# Patient Record
Sex: MALE | Race: OTHER | Employment: UNEMPLOYED | URBAN - METROPOLITAN AREA
[De-identification: names, ages, dates, MRNs, and addresses within clinical notes are randomized per-mention and may not be internally consistent; named-entity substitution may affect disease eponyms.]

---

## 2019-03-18 ENCOUNTER — HOSPITAL ENCOUNTER (EMERGENCY)
Age: 59
Discharge: HOME | End: 2019-03-18
Attending: EMERGENCY MEDICINE
Payer: COMMERCIAL

## 2019-03-18 VITALS
TEMPERATURE: 97.4 F | DIASTOLIC BLOOD PRESSURE: 92 MMHG | OXYGEN SATURATION: 96 % | HEART RATE: 67 BPM | RESPIRATION RATE: 16 BRPM | SYSTOLIC BLOOD PRESSURE: 154 MMHG

## 2019-03-18 DIAGNOSIS — M54.50 ACUTE MIDLINE LOW BACK PAIN WITHOUT SCIATICA: Primary | ICD-10-CM

## 2019-03-18 PROCEDURE — 96372 THER/PROPH/DIAG INJ SC/IM: CPT

## 2019-03-18 PROCEDURE — 74011250636 HC RX REV CODE- 250/636: Performed by: EMERGENCY MEDICINE

## 2019-03-18 PROCEDURE — 74011250637 HC RX REV CODE- 250/637: Performed by: EMERGENCY MEDICINE

## 2019-03-18 PROCEDURE — 99283 EMERGENCY DEPT VISIT LOW MDM: CPT

## 2019-03-18 RX ORDER — DIAZEPAM 5 MG/1
5 TABLET ORAL
Status: DISCONTINUED | OUTPATIENT
Start: 2019-03-18 | End: 2019-03-18

## 2019-03-18 RX ORDER — DIAZEPAM 5 MG/1
5 TABLET ORAL
Status: COMPLETED | OUTPATIENT
Start: 2019-03-18 | End: 2019-03-18

## 2019-03-18 RX ORDER — HYDROMORPHONE HYDROCHLORIDE 2 MG/ML
1 INJECTION, SOLUTION INTRAMUSCULAR; INTRAVENOUS; SUBCUTANEOUS
Status: COMPLETED | OUTPATIENT
Start: 2019-03-18 | End: 2019-03-18

## 2019-03-18 RX ORDER — METHOCARBAMOL 750 MG/1
750 TABLET, FILM COATED ORAL 4 TIMES DAILY
Qty: 28 TAB | Refills: 0 | Status: SHIPPED | OUTPATIENT
Start: 2019-03-18 | End: 2019-03-25

## 2019-03-18 RX ADMIN — HYDROMORPHONE HYDROCHLORIDE 1 MG: 2 INJECTION INTRAMUSCULAR; INTRAVENOUS; SUBCUTANEOUS at 14:58

## 2019-03-18 RX ADMIN — DIAZEPAM 5 MG: 5 TABLET ORAL at 13:34

## 2019-03-18 NOTE — ED TRIAGE NOTES
Pt chronic back pain patient with herniation of L4-L6. Pt states was working on fan of oven and the next day with 10/10 back pain with limited movement, sharp shooting pain. Denies radiation to legs or difficulty with bowel or bladder control.

## 2019-03-18 NOTE — ED PROVIDER NOTES
62 y.o. male with past medical history significant for Factor V Leiden, h/o PE, who presents to the ED with assistance of a wheelchair, with chief complaint of lumbar back pain. Pt reports that he has chronic back pain due to herniated lumbar discs. States that he is followed by Dr. Elizabeth Farfan at [facility] for neurosurgery and is scheduled to have a procedure to \"burn some nerves\" on 4/2/19. Notes that he is also followed by pain management. Pt reports that he was fixing his stove on Saturday (3/16/19) and felt something, but no pain. He states that he woke up Sunday morning with back pain and was unable to get out of bed. Notes that his pain is in the lumbar area and radiates upwards and down into the left leg. Pt describes his pain as \"shocking\" and rates his current pain as 10/10 in intensity. He states that his pain is exacerbated when sitting down or standing from a seated position. Reports that he took Tylenol, Advil and Meloxicam without relief, but took Norco 5/325 x 2 today with relief. Notes that he is currently taking Coumadin due to PE and Factor V Leiden. Pt specifically denies peritoneal numbness. There are no other acute medical concerns at this time. PCP: No primary care provider on file. Note written by Margot Sandoval, as dictated by Shahnaz Mcconnell MD 12:56 PM      The history is provided by the patient and medical records. No  was used. No past medical history on file. No past surgical history on file. No family history on file.     Social History     Socioeconomic History    Marital status: Not on file     Spouse name: Not on file    Number of children: Not on file    Years of education: Not on file    Highest education level: Not on file   Social Needs    Financial resource strain: Not on file    Food insecurity - worry: Not on file    Food insecurity - inability: Not on file    Transportation needs - medical: Not on file   [place] Transportation needs - non-medical: Not on file   Occupational History    Not on file   Tobacco Use    Smoking status: Not on file   Substance and Sexual Activity    Alcohol use: Not on file    Drug use: Not on file    Sexual activity: Not on file   Other Topics Concern    Not on file   Social History Narrative    Not on file         ALLERGIES: Morphine    Review of Systems   Constitutional: Negative for chills and fever. HENT: Negative for ear pain and sore throat. Eyes: Negative for pain. Respiratory: Negative for chest tightness and shortness of breath. Cardiovascular: Negative for chest pain and leg swelling. Gastrointestinal: Negative for abdominal pain, nausea and vomiting. Genitourinary: Negative for dysuria and flank pain. Musculoskeletal: Positive for back pain (lumbar). Skin: Negative for rash. Neurological: Negative for numbness and headaches. All other systems reviewed and are negative. Vitals:    03/18/19 1255   BP: (!) 154/92   Pulse: 67   Resp: 16   Temp: 97.4 °F (36.3 °C)   SpO2: 96%            Physical Exam   Constitutional: No distress. HENT:   Head: Normocephalic and atraumatic. Eyes: Conjunctivae are normal. Pupils are equal, round, and reactive to light. No scleral icterus. Neck: No tracheal deviation present. Cardiovascular: Normal rate, regular rhythm and normal heart sounds. Pulmonary/Chest: Effort normal and breath sounds normal. No stridor. No respiratory distress. Abdominal: Soft. He exhibits no distension. There is no tenderness. Musculoskeletal: He exhibits no edema or deformity. Lumbar back: He exhibits tenderness. Neurological: He is alert. Normal motor and sensation of lower extremities. Normal reflexes. Skin: Skin is warm and dry. Psychiatric: He has a normal mood and affect. Nursing note and vitals reviewed.   Note written by Margot Richey, as dictated by Sneha Mixon MD 12:56 PM      [facility] Procedures     3:13 PM  Patient's results have been reviewed with them. Patient and/or family have verbally conveyed their understanding and agreement of the patient's signs, symptoms, diagnosis, treatment and prognosis and additionally agree to follow up as recommended or return to the Emergency Room should their condition change prior to follow-up. Discharge instructions have also been provided to the patient with some educational information regarding their diagnosis as well a list of reasons why they would want to return to the ER prior to their follow-up appointment should their condition change. LABORATORY TESTS:  Labs Reviewed - No data to display    IMAGING RESULTS:  No results found. MEDICATIONS GIVEN:  Medications   diazePAM (VALIUM) tablet 5 mg (5 mg Oral Given 3/18/19 2744)   HYDROmorphone (PF) (DILAUDID) injection 1 mg (1 mg IntraMUSCular Given 3/18/19 1331)       IMPRESSION:  1. Acute midline low back pain without sciatica        PLAN:  1. Discharge Medication List as of 3/18/2019  3:13 PM      START taking these medications    Details   methocarbamol (ROBAXIN-750) 750 mg tablet Take 1 Tab by mouth four (4) times daily for 7 days. , Print, Disp-28 Tab, R-0           2. Follow-up Information     Follow up With Specialties Details Why Contact Info    Your spine doctor and pain doctor  Schedule an appointment as soon as possible for a visit      [facility] EMERGENCY DEPT Emergency Medicine  If symptoms worsen or new concerns [address] [place] [address]  [phone]        3. Return to ED for new or worsening symptoms       Kristen Mccann MD

## 2019-03-18 NOTE — DISCHARGE INSTRUCTIONS
Patient Education        Back Pain: Care Instructions  Your Care Instructions    Back pain has many possible causes. It is often related to problems with muscles and ligaments of the back. It may also be related to problems with the nerves, discs, or bones of the back. Moving, lifting, standing, sitting, or sleeping in an awkward way can strain the back. Sometimes you don't notice the injury until later. Arthritis is another common cause of back pain. Although it may hurt a lot, back pain usually improves on its own within several weeks. Most people recover in 12 weeks or less. Using good home treatment and being careful not to stress your back can help you feel better sooner. Follow-up care is a key part of your treatment and safety. Be sure to make and go to all appointments, and call your doctor if you are having problems. It's also a good idea to know your test results and keep a list of the medicines you take. How can you care for yourself at home? · Sit or lie in positions that are most comfortable and reduce your pain. Try one of these positions when you lie down:  ? Lie on your back with your knees bent and supported by large pillows. ? Lie on the floor with your legs on the seat of a sofa or chair. ? Lie on your side with your knees and hips bent and a pillow between your legs. ? Lie on your stomach if it does not make pain worse. · Do not sit up in bed, and avoid soft couches and twisted positions. Bed rest can help relieve pain at first, but it delays healing. Avoid bed rest after the first day of back pain. · Change positions every 30 minutes. If you must sit for long periods of time, take breaks from sitting. Get up and walk around, or lie in a comfortable position. · Try using a heating pad on a low or medium setting for 15 to 20 minutes every 2 or 3 hours. Try a warm shower in place of one session with the heating pad. · You can also try an ice pack for 10 to 15 minutes every 2 to 3 hours. Put a thin cloth between the ice pack and your skin. · Take pain medicines exactly as directed. ? If the doctor gave you a prescription medicine for pain, take it as prescribed. ? If you are not taking a prescription pain medicine, ask your doctor if you can take an over-the-counter medicine. · Take short walks several times a day. You can start with 5 to 10 minutes, 3 or 4 times a day, and work up to longer walks. Walk on level surfaces and avoid hills and stairs until your back is better. · Return to work and other activities as soon as you can. Continued rest without activity is usually not good for your back. · To prevent future back pain, do exercises to stretch and strengthen your back and stomach. Learn how to use good posture, safe lifting techniques, and proper body mechanics. When should you call for help? Call your doctor now or seek immediate medical care if:    · You have new or worsening numbness in your legs.     · You have new or worsening weakness in your legs. (This could make it hard to stand up.)     · You lose control of your bladder or bowels.    Watch closely for changes in your health, and be sure to contact your doctor if:    · You have a fever, lose weight, or don't feel well.     · You do not get better as expected. Where can you learn more? Go to http://isra-joaquín.info/. Enter Q322 in the search box to learn more about \"Back Pain: Care Instructions. \"  Current as of: September 20, 2018  Content Version: 11.9  © 9992-3468 [employer]. Care instructions adapted under license by [employer] (which disclaims liability or warranty for this information). If you have questions about a medical condition or this instruction, always ask your healthcare professional. [place] [address] any warranty or liability for your use of this information.

## 2021-09-13 ENCOUNTER — HOSPITAL ENCOUNTER (OUTPATIENT)
Dept: PREADMISSION TESTING | Age: 61
Discharge: HOME | End: 2021-09-13
Payer: COMMERCIAL

## 2021-09-13 VITALS
SYSTOLIC BLOOD PRESSURE: 123 MMHG | TEMPERATURE: 98.1 F | HEIGHT: 69 IN | BODY MASS INDEX: 30.76 KG/M2 | WEIGHT: 207.67 LBS | HEART RATE: 60 BPM | DIASTOLIC BLOOD PRESSURE: 74 MMHG

## 2021-09-13 LAB
ABO + RH BLD: NORMAL
APPEARANCE UR: CLEAR
BACTERIA URNS QL MICRO: NEGATIVE /HPF
BILIRUB UR QL: NEGATIVE
BLOOD GROUP ANTIBODIES SERPL: NORMAL
COLOR UR: NORMAL
EPITH CASTS URNS QL MICRO: NORMAL /LPF
EST. AVERAGE GLUCOSE BLD GHB EST-MCNC: 108 MG/DL
GLUCOSE UR STRIP.AUTO-MCNC: NEGATIVE MG/DL
HBA1C MFR BLD: 5.4 % (ref 4–5.6)
HGB UR QL STRIP: NEGATIVE
HYALINE CASTS URNS QL MICRO: NORMAL /LPF (ref 0–5)
KETONES UR QL STRIP.AUTO: NEGATIVE MG/DL
LEUKOCYTE ESTERASE UR QL STRIP.AUTO: NEGATIVE
NITRITE UR QL STRIP.AUTO: NEGATIVE
PH UR STRIP: 5.5 [PH] (ref 5–8)
PROT UR STRIP-MCNC: NEGATIVE MG/DL
RBC #/AREA URNS HPF: NORMAL /HPF (ref 0–5)
SP GR UR REFRACTOMETRY: 1.01 (ref 1–1.03)
SPECIMEN EXP DATE BLD: NORMAL
UA: UC IF INDICATED,UAUC: NORMAL
UROBILINOGEN UR QL STRIP.AUTO: 0.2 EU/DL (ref 0.2–1)
WBC URNS QL MICRO: NORMAL /HPF (ref 0–4)

## 2021-09-13 PROCEDURE — 86901 BLOOD TYPING SEROLOGIC RH(D): CPT

## 2021-09-13 PROCEDURE — 83036 HEMOGLOBIN GLYCOSYLATED A1C: CPT

## 2021-09-13 PROCEDURE — 81001 URINALYSIS AUTO W/SCOPE: CPT

## 2021-09-13 PROCEDURE — 36415 COLL VENOUS BLD VENIPUNCTURE: CPT

## 2021-09-13 RX ORDER — HYDROMORPHONE HYDROCHLORIDE 2 MG/1
TABLET ORAL
Status: ON HOLD | COMMUNITY
End: 2021-09-21 | Stop reason: SDUPTHER

## 2021-09-13 RX ORDER — WARFARIN SODIUM 5 MG/1
5 TABLET ORAL DAILY
COMMUNITY

## 2021-09-13 RX ORDER — WARFARIN 1 MG/1
1 TABLET ORAL DAILY
COMMUNITY

## 2021-09-13 RX ORDER — ASCORBIC ACID 500 MG
1000 TABLET ORAL DAILY
COMMUNITY

## 2021-09-13 RX ORDER — GABAPENTIN 300 MG/1
300 CAPSULE ORAL 3 TIMES DAILY
COMMUNITY
End: 2021-12-16 | Stop reason: SDUPTHER

## 2021-09-13 RX ORDER — MELATONIN
1 DAILY
COMMUNITY

## 2021-09-13 RX ORDER — ACETAMINOPHEN 500 MG
2 TABLET ORAL
COMMUNITY

## 2021-09-14 ENCOUNTER — TRANSCRIBE ORDER (OUTPATIENT)
Dept: REGISTRATION | Age: 61
End: 2021-09-14

## 2021-09-14 DIAGNOSIS — Z01.812 ENCOUNTER FOR PREOPERATIVE SCREENING LABORATORY TESTING FOR COVID-19 VIRUS: Primary | ICD-10-CM

## 2021-09-14 DIAGNOSIS — Z20.822 ENCOUNTER FOR PREOPERATIVE SCREENING LABORATORY TESTING FOR COVID-19 VIRUS: Primary | ICD-10-CM

## 2021-09-14 LAB
BACTERIA SPEC CULT: NORMAL
BACTERIA SPEC CULT: NORMAL
SERVICE CMNT-IMP: NORMAL

## 2021-09-14 NOTE — PERIOP NOTES
PAT Nurse Practitioner   Pre-Operative Chart Review/Assessment:-ORTHOPEDIC/NEUROSURGICAL SPINE                Patient Name:  Marc Carranza                                                           Age:   61 y.o.    :  1960     Today's Date:  2021     Date of PAT:   2021      Date of Surgery:    2021      Procedure(s):  Minimally Invasive Surgery Transforaminal Lumbar Interbody Fusion With Robotics      Surgeon:   Dr. Major Jansen                       PLAN:       1)  PCP: Dr. Tae Cat        2)  Cardiac Clearance: EKG and METs reviewed. No further cardiac evaluation requested. 3)  Diabetic Treatment Consult: Not indicated. A1c-5.4       4)  Sleep Apnea evaluation:  Not indicated. SHORTY Score 2.       5)  Treatment for MRSA/Staph Aureus: Neg       6)  Additional Concerns: Former smoker, Factor V Leiden, hx of PE              Vital Signs:         Vitals:    21 1152   BP: 123/74   Pulse: 60   Temp: 98.1 °F (36.7 °C)   Weight: 94.2 kg (207 lb 10.8 oz)   Height: 5' 9\" (1.753 m)          Preoperative Nutrition Screen (LUIGI)   Patient's Age: 61 y.o. Patient's BMI: Estimated body mass index is 30.67 kg/m² as calculated from the following:    Height as of this encounter: 5' 9\" (1.753 m). Weight as of this encounter: 94.2 kg (207 lb 10.8 oz). If the answer to any of the following is Yes, then recommend prescribe Oral Nutrition Supplements (ONS) for at least 5 days prior to surgery and/or order referral to dietitian for further assessment and nutrition therapy. 1. Does the patient have a documented serum albumin less than 3.0 within the last 90 days? Unknown = 0       2. Is patient's BMI less than 18.5 (or less than 20 if age over 72)? No = 0        3. Has the patient had an unplanned weight loss of 10% of body weight or more in the last 6 months? No = 0   4. Has the patient been eating less than 50% of their normal diet in the preceding week?  Yes = 1   LUIGI Score (number of Yes responses), 0-4 1     Plan:   Patient was educated on the following topics   2 immuno nutrition shakes daily, 5 days prior to surgery and 5 days post operatively. 3 pre-surgery drinks. Electronically signed by Hi Kyle NP on 9/14/21 at 9:58 AM    ____________________________________________  PAST MEDICAL HISTORY  Past Medical History:   Diagnosis Date    Arthritis     Chronic pain     Diverticulitis     Factor 5 Leiden mutation, heterozygous ([address]     Pulmonary embolism ([facility] [address] 2001      ____________________________________________  PAST SURGICAL HISTORY  Past Surgical History:   Procedure Laterality Date    HX COLOSTOMY  2006    HX GI      COLONOSCOPY    HX GI  2007    COLOSTOMY REVERSED     HX KNEE ARTHROSCOPY Left 2001    X2    HX ORTHOPAEDIC Left 2019    WRIST    HX ROTATOR CUFF REPAIR Left 2006    HI ABDOMEN SURGERY PROC UNLISTED  2006    PARTIAL COLECTOMY     ____________________________________________  HOME MEDICATIONS    Current Outpatient Medications   Medication Sig    warfarin (Coumadin) 5 mg tablet Take 5 mg by mouth daily.  warfarin (COUMADIN) 1 mg tablet Take 1 mg by mouth daily.  HYDROmorphone (DILAUDID) 2 mg tablet Take  by mouth every six (6) hours as needed for Pain.  gabapentin (NEURONTIN) 300 mg capsule Take 300 mg by mouth three (3) times daily.  acetaminophen (Tylenol Extra Strength) 500 mg tablet Take  by mouth every six (6) hours as needed for Pain.  cholecalciferol (Vitamin D3) (1000 Units /25 mcg) tablet Take  by mouth daily.  ascorbic acid, vitamin C, (Vitamin C) 500 mg tablet Take 1,000 mg by mouth daily.      No current facility-administered medications for this encounter.      ____________________________________________  ALLERGIES  Allergies   Allergen Reactions    Morphine Other (comments)      ____________________________________________  SOCIAL HISTORY  Social History     Tobacco Use    Smoking status: Former Smoker Packs/day: 1.00     Years: 8.00     Pack years: 8.00     Quit date: 12     Years since quittin.7    Smokeless tobacco: Never Used   Substance Use Topics    Alcohol use: Yes     Alcohol/week: 1.0 standard drinks     Types: 1 Shots of liquor per week      ____________________________________________   Internal Administration   First Dose      Second Dose         Last COVID Lab SARS-CoV-2 ( )   Date Value   09/15/2021 Not detected        ____________________________________________    Labs:     Hospital Outpatient Visit on 2021   Component Date Value Ref Range Status    Crossmatch Expiration 2021,2359   Final    ABO/Rh(D) 2021 A POSITIVE   Final    Antibody screen 2021 NEG   Final    Color 2021 YELLOW/STRAW    Final    Color Reference Range: Straw, Yellow or Dark Yellow    Appearance 2021 CLEAR  CLEAR   Final    Specific gravity 2021 1.008  1.003 - 1.030   Final    pH (UA) 2021 5.5  5.0 - 8.0   Final    Protein 2021 Negative  NEG mg/dL Final    Glucose 2021 Negative  NEG mg/dL Final    Ketone 2021 Negative  NEG mg/dL Final    Bilirubin 2021 Negative  NEG   Final    Blood 2021 Negative  NEG   Final    Urobilinogen 2021 0.2  0.2 - 1.0 EU/dL Final    Nitrites 2021 Negative  NEG   Final    Leukocyte Esterase 2021 Negative  NEG   Final    UA:UC IF INDICATED 2021 CULTURE NOT INDICATED BY UA RESULT  CNI   Final    WBC 2021 0-4  0 - 4 /hpf Final    RBC 2021 0-5  0 - 5 /hpf Final    Epithelial cells 2021 FEW  FEW /lpf Final    Epithelial cell category consists of squamous cells and /or transitional urothelial cells. Renal tubular cells, if present, are separately identified as such.     Bacteria 2021 Negative  NEG /hpf Final    Hyaline cast 2021 0-2  0 - 5 /lpf Final    Hemoglobin A1c 2021 5.4  4.0 - 5.6 % Final    Comment: NEW METHOD  PLEASE NOTE NEW REFERENCE RANGE  (NOTE)  HbA1C Interpretive Ranges  <5.7              Normal  5.7 - 6.4         Consider Prediabetes  >6.5              Consider Diabetes      Est. average glucose 09/13/2021 108  mg/dL Final    Special Requests: 09/13/2021 NO SPECIAL REQUESTS    Final    Culture result: 09/13/2021 MRSA NOT PRESENT    Final       Skin:     Denies open wounds, cuts, sores, rashes or other areas of concern in PAT assessment.         Tad Carl NP

## 2021-09-15 ENCOUNTER — HOSPITAL ENCOUNTER (OUTPATIENT)
Dept: PREADMISSION TESTING | Age: 61
Discharge: HOME | End: 2021-09-15
Payer: COMMERCIAL

## 2021-09-15 DIAGNOSIS — Z01.812 ENCOUNTER FOR PREOPERATIVE SCREENING LABORATORY TESTING FOR COVID-19 VIRUS: ICD-10-CM

## 2021-09-15 DIAGNOSIS — Z20.822 ENCOUNTER FOR PREOPERATIVE SCREENING LABORATORY TESTING FOR COVID-19 VIRUS: ICD-10-CM

## 2021-09-15 PROCEDURE — U0005 INFEC AGEN DETEC AMPLI PROBE: HCPCS

## 2021-09-15 NOTE — H&P
Candice Hicks  : 1960   / Language: [place] / Race: White  Male      History of Present Illness   The patient is a 61year old male who presents with a complaint of Low Back Pain. Note for \"Low Back Pain\": Mr. Eliel George presents today with a long standing history of low back pain. He initially injured his back in the early  when lifting a  and then again in  when lifting a washing machine. Throughout the years he has been intermittently treated with physical therapy, medications, nerve ablations, epidural spinal injections and by a chiropractor. He is currently seeing pain management and taking 300 mg gabapentin TID, 2 mg of diluadid up to four times daily and tylenol two-three times daily for his symptoms. He has right sided low back pain with constant pain in bilateral buttock and intermittent numbness in bilateral calves and bottom of his feet. His symptoms are made worse with prolonged sitting and standing as well as activity. His symptoms are improved when sitting for short periods of time. He denies bowel or bladder control changes. Denies lower extremity weakness. He has been seen by three other surgeons who have discussed possible surgical intervention with him, to include an ALIF. Allergies   No Known Allergies    No Known Drug Allergies       Medication History   Gabapentin  (300MG Capsule, Oral) Active. Tylenol  (325MG Tablet, Oral) Active. Medications Reconciled     Diagnostic Studies History   MRI, Spine   DONE AT [facility], Date: 2021, Results: Review of the MRI shows severe spondylosis at L5-S1 with Modic changes noted. Bilateral foraminal narrowing. Physical Exam   Neurologic  Sensory  Light Touch - Intact - Globally. Overall Assessment of Muscle Strength and Tone reveals  Lower Extremities - Right Iliopsoas - 5/5. Left Iliopsoas - 5/5. Right Tibialis Anterior - 5/5. Left Tibialis Anterior - 5/5. Right Gastroc-Soleus - 5/5. Left Gastroc-Soleus - 5/5. Right EHL - 4/5 . Left EHL - 5/5 and 4/5. General Assessment of Reflexes  Right Ankle - Clonus is not present. Left Ankle - Clonus is not present. Reflexes (Dermatomes)  2/2 Normal - Left Achilles (L5-S2), Left Knee (L2-4), Right Achilles (L5-S2) and Right Knee (L2-4). Musculoskeletal  Global Assessment  Examination of related systems reveals - well-developed, well-nourished, in no acute distress, alert and oriented x 3. Gait and Station - normal gait and station and normal posture. Right Lower Extremity - normal strength and tone, normal range of motion without pain and no instability, subluxation or laxity. Left Lower Extremity - normal strength and tone, normal range of motion without pain and no instability, subluxation or laxity. Spine/Ribs/Pelvis  Cervical Spine - Examination of the cervical spine reveals - no tenderness to palpation, no pain, no swelling, edema or erythema, normal cervical spine movements and normal sensation. Thoracic (Dorsal) Spine - Examination of the thoracic spine reveals - no tenderness over thoracic vertebrae, no pain, normal sensation and normal thoracic spine movements. Lumbosacral Spine - Examination of the lumbosacral spine reveals - no known fractures or deformities. Inspection and Palpation - Tenderness - moderate. Assessment of pain reveals the following findings - The pain is characterized as - moderate. Location - pain refers to lower back bilaterally. ROJM - Trunk Extension - 15 degrees. Lumbar Spine Flexion - 35 °. Lumbosacral Spine - Functional Testing - Babinski Test negative, Prone Knee Bending Test negative, Slump Test negative, Straight Leg Raising Test negative. Assessment & Plan    Disc degeneration of lumbar region (722.52  M51.36)      Lumbar stenosis with neurogenic claudication (724.03  M48.062)  Impression: We had a lengthy discussion today. He has a long history of worsening lower back pain and leg pain from the progressive spondylosis at L5-S1. He has been in pain management for years without significant improvement. His quality of life is worsening. He has had extensive abdominal surgery including a colostomy and reanastomosis. This precludes a safe anterior exposure for an ALIF or arthoplasty. He is a candidate for a MIS TLIF with robotics. Risk and benefits discussed with him. The risks and benefits were discussed at length with the patient and the patient has elected to proceed. Indications for surgery include failed conservative treatment. Alternative treatments, risks and the perioperative course were discussed with the patient. All questions were answered. The risks and benefits of the procedure were explained. Benefits include definitive diagnosis, relief of pain, elimination of deformity and improved function. Risks of surgery including bleeding, infection, weakness, numbness, CSF leak, failure to improve symptoms, exacerbation of medical co-morbidities and even death were discussed with the patient.       Chronic bilateral low back pain with bilateral sciatica (724.2  M54.42)

## 2021-09-15 NOTE — PERIOP NOTES
9/15/21 @ 1137 - MRSA RESULT FAXED VIA [facility] TO SURG. OFFICE & PT'S PCP.    9/15/21 @ 1425 - LEFT DETAILED VOICE MESSAGE FOR MEDICAL RECORDS AT PT'S PCP TO FAX MOST RECENT EKG TO PAT.    9/15/21 @ 6799 - SPOKE 65 Rachana Kitchen AT PT'S PCP. JUDI STATED THAT EKG WAS FAXED ON 9/14/21 TO ATTN: Mike Tirado. EXPLAINED THAT WE HAVEN'T RECEIVED EKG. JUDI WILL RE-FAX EKG TO PAT TO MY ATTENTION. JUDI STATED THAT EKG IS NSR, HAS SIGNATURE & EKG DONE ON 9/1/21.

## 2021-09-16 LAB
SARS-COV-2, XPLCVT: NOT DETECTED
SOURCE, COVRS: NORMAL

## 2021-09-18 ENCOUNTER — ANESTHESIA EVENT (OUTPATIENT)
Dept: SURGERY | Age: 61
DRG: 455 | End: 2021-09-18
Payer: COMMERCIAL

## 2021-09-20 ENCOUNTER — HOSPITAL ENCOUNTER (INPATIENT)
Age: 61
LOS: 1 days | Discharge: HOME HEALTH | DRG: 455 | End: 2021-09-21
Attending: ORTHOPAEDIC SURGERY | Admitting: ORTHOPAEDIC SURGERY
Payer: COMMERCIAL

## 2021-09-20 ENCOUNTER — APPOINTMENT (OUTPATIENT)
Dept: GENERAL RADIOLOGY | Age: 61
DRG: 455 | End: 2021-09-20
Attending: ORTHOPAEDIC SURGERY
Payer: COMMERCIAL

## 2021-09-20 ENCOUNTER — ANESTHESIA (OUTPATIENT)
Dept: SURGERY | Age: 61
DRG: 455 | End: 2021-09-20
Payer: COMMERCIAL

## 2021-09-20 DIAGNOSIS — Z98.1 S/P LUMBAR FUSION: Primary | ICD-10-CM

## 2021-09-20 PROBLEM — M51.36 DDD (DEGENERATIVE DISC DISEASE), LUMBAR: Status: ACTIVE | Noted: 2021-09-20

## 2021-09-20 PROBLEM — M48.062 LUMBAR STENOSIS WITH NEUROGENIC CLAUDICATION: Status: ACTIVE | Noted: 2021-09-20

## 2021-09-20 LAB
GLUCOSE BLD STRIP.AUTO-MCNC: 80 MG/DL (ref 65–117)
SERVICE CMNT-IMP: NORMAL

## 2021-09-20 PROCEDURE — 8E0W0CZ ROBOTIC ASSISTED PROCEDURE OF TRUNK REGION, OPEN APPROACH: ICD-10-PCS | Performed by: ORTHOPAEDIC SURGERY

## 2021-09-20 PROCEDURE — 74011250636 HC RX REV CODE- 250/636: Performed by: STUDENT IN AN ORGANIZED HEALTH CARE EDUCATION/TRAINING PROGRAM

## 2021-09-20 PROCEDURE — 76010000172 HC OR TIME 2.5 TO 3 HR INTENSV-TIER 1: Performed by: ORTHOPAEDIC SURGERY

## 2021-09-20 PROCEDURE — 77010033678 HC OXYGEN DAILY

## 2021-09-20 PROCEDURE — 77030018723 HC ELCTRD BLD COVD -A: Performed by: ORTHOPAEDIC SURGERY

## 2021-09-20 PROCEDURE — C1713 ANCHOR/SCREW BN/BN,TIS/BN: HCPCS | Performed by: ORTHOPAEDIC SURGERY

## 2021-09-20 PROCEDURE — 0ST40ZZ RESECTION OF LUMBOSACRAL DISC, OPEN APPROACH: ICD-10-PCS | Performed by: ORTHOPAEDIC SURGERY

## 2021-09-20 PROCEDURE — 77030004391 HC BUR FLUT MEDT -C: Performed by: ORTHOPAEDIC SURGERY

## 2021-09-20 PROCEDURE — 74011250636 HC RX REV CODE- 250/636: Performed by: ORTHOPAEDIC SURGERY

## 2021-09-20 PROCEDURE — 2709999900 HC NON-CHARGEABLE SUPPLY: Performed by: ORTHOPAEDIC SURGERY

## 2021-09-20 PROCEDURE — 74011000258 HC RX REV CODE- 258: Performed by: PHYSICIAN ASSISTANT

## 2021-09-20 PROCEDURE — 74011250637 HC RX REV CODE- 250/637: Performed by: PHYSICIAN ASSISTANT

## 2021-09-20 PROCEDURE — 77030038600 HC TU BPLR IRR DISP STRY -B: Performed by: ORTHOPAEDIC SURGERY

## 2021-09-20 PROCEDURE — 77030020268 HC MISC GENERAL SUPPLY: Performed by: ORTHOPAEDIC SURGERY

## 2021-09-20 PROCEDURE — 82962 GLUCOSE BLOOD TEST: CPT

## 2021-09-20 PROCEDURE — 77030040361 HC SLV COMPR DVT MDII -B: Performed by: ORTHOPAEDIC SURGERY

## 2021-09-20 PROCEDURE — 74011250636 HC RX REV CODE- 250/636: Performed by: ANESTHESIOLOGY

## 2021-09-20 PROCEDURE — 74011000250 HC RX REV CODE- 250: Performed by: STUDENT IN AN ORGANIZED HEALTH CARE EDUCATION/TRAINING PROGRAM

## 2021-09-20 PROCEDURE — 77030029099 HC BN WAX SSPC -A: Performed by: ORTHOPAEDIC SURGERY

## 2021-09-20 PROCEDURE — 77030037713 HC CLOSR DEV INCIS ZIP STRY -B: Performed by: ORTHOPAEDIC SURGERY

## 2021-09-20 PROCEDURE — 77030038552 HC DRN WND MDII -A: Performed by: ORTHOPAEDIC SURGERY

## 2021-09-20 PROCEDURE — 74011000250 HC RX REV CODE- 250: Performed by: PHYSICIAN ASSISTANT

## 2021-09-20 PROCEDURE — 0SG30K1 FUSION OF LUMBOSACRAL JOINT WITH NONAUTOLOGOUS TISSUE SUBSTITUTE, POSTERIOR APPROACH, POSTERIOR COLUMN, OPEN APPROACH: ICD-10-PCS | Performed by: ORTHOPAEDIC SURGERY

## 2021-09-20 PROCEDURE — C1821 INTERSPINOUS IMPLANT: HCPCS | Performed by: ORTHOPAEDIC SURGERY

## 2021-09-20 PROCEDURE — 74011000250 HC RX REV CODE- 250: Performed by: ORTHOPAEDIC SURGERY

## 2021-09-20 PROCEDURE — 0SG30AJ FUSION OF LUMBOSACRAL JOINT WITH INTERBODY FUSION DEVICE, POSTERIOR APPROACH, ANTERIOR COLUMN, OPEN APPROACH: ICD-10-PCS | Performed by: ORTHOPAEDIC SURGERY

## 2021-09-20 PROCEDURE — 76210000001 HC OR PH I REC 2.5 TO 3 HR: Performed by: ORTHOPAEDIC SURGERY

## 2021-09-20 PROCEDURE — 74011250636 HC RX REV CODE- 250/636: Performed by: PHYSICIAN ASSISTANT

## 2021-09-20 PROCEDURE — 01NR0ZZ RELEASE SACRAL NERVE, OPEN APPROACH: ICD-10-PCS | Performed by: ORTHOPAEDIC SURGERY

## 2021-09-20 PROCEDURE — 74011250637 HC RX REV CODE- 250/637: Performed by: ANESTHESIOLOGY

## 2021-09-20 PROCEDURE — 77030031139 HC SUT VCRL2 J&J -A: Performed by: ORTHOPAEDIC SURGERY

## 2021-09-20 PROCEDURE — 77030008684 HC TU ET CUF COVD -B: Performed by: STUDENT IN AN ORGANIZED HEALTH CARE EDUCATION/TRAINING PROGRAM

## 2021-09-20 PROCEDURE — 74011000250 HC RX REV CODE- 250: Performed by: NURSE ANESTHETIST, CERTIFIED REGISTERED

## 2021-09-20 PROCEDURE — 65270000032 HC RM SEMIPRIVATE

## 2021-09-20 PROCEDURE — 72100 X-RAY EXAM L-S SPINE 2/3 VWS: CPT

## 2021-09-20 PROCEDURE — 77030037808 HC GRFT SPNG OSTEOAMP BTUS -H1: Performed by: ORTHOPAEDIC SURGERY

## 2021-09-20 PROCEDURE — 76060000036 HC ANESTHESIA 2.5 TO 3 HR: Performed by: ORTHOPAEDIC SURGERY

## 2021-09-20 PROCEDURE — 74011000258 HC RX REV CODE- 258: Performed by: NURSE ANESTHETIST, CERTIFIED REGISTERED

## 2021-09-20 PROCEDURE — 77030003193 HC GRFT RECOMB BN MEDT -H: Performed by: ORTHOPAEDIC SURGERY

## 2021-09-20 PROCEDURE — 74011250636 HC RX REV CODE- 250/636: Performed by: NURSE ANESTHETIST, CERTIFIED REGISTERED

## 2021-09-20 PROCEDURE — 77030034475 HC MISC IMPL SPN: Performed by: ORTHOPAEDIC SURGERY

## 2021-09-20 PROCEDURE — 77030039456 HC KT SPINE DISP MAZR -G1: Performed by: ORTHOPAEDIC SURGERY

## 2021-09-20 DEVICE — GRAFT BNE SUB M W20XH7XL30MM COMPRESSIBLE SPNG STRP PROVIDE: Type: IMPLANTABLE DEVICE | Site: SPINE LUMBAR | Status: FUNCTIONAL

## 2021-09-20 DEVICE — SET SCREW 6540530 5.5/6.0 SOLERA VOYAGER
Type: IMPLANTABLE DEVICE | Site: SPINE LUMBAR | Status: FUNCTIONAL
Brand: CD HORIZON® SOLERA® SPINAL SYSTEM

## 2021-09-20 DEVICE — SCREW 55850016545 5.5 VOYAGER MAS 6.5X45
Type: IMPLANTABLE DEVICE | Site: SPINE LUMBAR | Status: FUNCTIONAL
Brand: CD HORIZON® SOLERA® VOYAGER™ SPINAL SYSTEM

## 2021-09-20 DEVICE — SCREW 55840017540 5.5 CNMAS 7.5X40 CC
Type: IMPLANTABLE DEVICE | Site: SPINE LUMBAR | Status: FUNCTIONAL
Brand: CD HORIZON® SPINAL SYSTEM

## 2021-09-20 DEVICE — BONE GRAFT KIT 7510100 INFUSE X SMALL
Type: IMPLANTABLE DEVICE | Site: SPINE LUMBAR | Status: FUNCTIONAL
Brand: INFUSE® BONE GRAFT

## 2021-09-20 DEVICE — SPACER 7770728 ELEVATE X-LOR 28X7MM
Type: IMPLANTABLE DEVICE | Site: SPINE LUMBAR | Status: FUNCTIONAL
Brand: ELEVATE™ SPINAL SYSTEM

## 2021-09-20 RX ORDER — HYDROMORPHONE HYDROCHLORIDE 1 MG/ML
0.2 INJECTION, SOLUTION INTRAMUSCULAR; INTRAVENOUS; SUBCUTANEOUS
Status: COMPLETED | OUTPATIENT
Start: 2021-09-20 | End: 2021-09-20

## 2021-09-20 RX ORDER — AMOXICILLIN 250 MG
1 CAPSULE ORAL 2 TIMES DAILY
Status: DISCONTINUED | OUTPATIENT
Start: 2021-09-21 | End: 2021-09-21 | Stop reason: HOSPADM

## 2021-09-20 RX ORDER — SODIUM CHLORIDE, SODIUM LACTATE, POTASSIUM CHLORIDE, CALCIUM CHLORIDE 600; 310; 30; 20 MG/100ML; MG/100ML; MG/100ML; MG/100ML
INJECTION, SOLUTION INTRAVENOUS
Status: DISCONTINUED | OUTPATIENT
Start: 2021-09-20 | End: 2021-09-20 | Stop reason: HOSPADM

## 2021-09-20 RX ORDER — VANCOMYCIN HYDROCHLORIDE 1 G/20ML
INJECTION, POWDER, LYOPHILIZED, FOR SOLUTION INTRAVENOUS AS NEEDED
Status: DISCONTINUED | OUTPATIENT
Start: 2021-09-20 | End: 2021-09-20 | Stop reason: HOSPADM

## 2021-09-20 RX ORDER — GABAPENTIN 100 MG/1
100 CAPSULE ORAL 3 TIMES DAILY
Status: DISCONTINUED | OUTPATIENT
Start: 2021-09-20 | End: 2021-09-21 | Stop reason: SDUPTHER

## 2021-09-20 RX ORDER — SODIUM CHLORIDE, SODIUM LACTATE, POTASSIUM CHLORIDE, CALCIUM CHLORIDE 600; 310; 30; 20 MG/100ML; MG/100ML; MG/100ML; MG/100ML
125 INJECTION, SOLUTION INTRAVENOUS CONTINUOUS
Status: DISCONTINUED | OUTPATIENT
Start: 2021-09-20 | End: 2021-09-20 | Stop reason: HOSPADM

## 2021-09-20 RX ORDER — HYDROMORPHONE HYDROCHLORIDE 1 MG/ML
0.5 INJECTION, SOLUTION INTRAMUSCULAR; INTRAVENOUS; SUBCUTANEOUS
Status: DISCONTINUED | OUTPATIENT
Start: 2021-09-20 | End: 2021-09-21 | Stop reason: HOSPADM

## 2021-09-20 RX ORDER — SODIUM CHLORIDE 0.9 % (FLUSH) 0.9 %
5-40 SYRINGE (ML) INJECTION EVERY 8 HOURS
Status: DISCONTINUED | OUTPATIENT
Start: 2021-09-20 | End: 2021-09-20 | Stop reason: HOSPADM

## 2021-09-20 RX ORDER — KETAMINE HYDROCHLORIDE 10 MG/ML
INJECTION, SOLUTION INTRAMUSCULAR; INTRAVENOUS AS NEEDED
Status: DISCONTINUED | OUTPATIENT
Start: 2021-09-20 | End: 2021-09-20 | Stop reason: HOSPADM

## 2021-09-20 RX ORDER — WARFARIN 1 MG/1
1 TABLET ORAL DAILY
Status: DISCONTINUED | OUTPATIENT
Start: 2021-09-21 | End: 2021-09-21

## 2021-09-20 RX ORDER — ROCURONIUM BROMIDE 10 MG/ML
INJECTION, SOLUTION INTRAVENOUS AS NEEDED
Status: DISCONTINUED | OUTPATIENT
Start: 2021-09-20 | End: 2021-09-20 | Stop reason: HOSPADM

## 2021-09-20 RX ORDER — HYDROMORPHONE HCL/0.9% NACL/PF 0.5 MG/ML
PLASTIC BAG, INJECTION (ML) INTRAVENOUS
Status: DISCONTINUED | OUTPATIENT
Start: 2021-09-20 | End: 2021-09-21

## 2021-09-20 RX ORDER — NALOXONE HYDROCHLORIDE 0.4 MG/ML
0.4 INJECTION, SOLUTION INTRAMUSCULAR; INTRAVENOUS; SUBCUTANEOUS AS NEEDED
Status: DISCONTINUED | OUTPATIENT
Start: 2021-09-20 | End: 2021-09-21 | Stop reason: HOSPADM

## 2021-09-20 RX ORDER — SODIUM CHLORIDE 9 MG/ML
25 INJECTION, SOLUTION INTRAVENOUS CONTINUOUS
Status: DISCONTINUED | OUTPATIENT
Start: 2021-09-20 | End: 2021-09-20 | Stop reason: HOSPADM

## 2021-09-20 RX ORDER — ACETAMINOPHEN 500 MG
1000 TABLET ORAL EVERY 6 HOURS
Status: DISCONTINUED | OUTPATIENT
Start: 2021-09-20 | End: 2021-09-21 | Stop reason: HOSPADM

## 2021-09-20 RX ORDER — FENTANYL CITRATE 50 UG/ML
25 INJECTION, SOLUTION INTRAMUSCULAR; INTRAVENOUS
Status: DISCONTINUED | OUTPATIENT
Start: 2021-09-20 | End: 2021-09-20 | Stop reason: HOSPADM

## 2021-09-20 RX ORDER — ONDANSETRON 2 MG/ML
INJECTION INTRAMUSCULAR; INTRAVENOUS AS NEEDED
Status: DISCONTINUED | OUTPATIENT
Start: 2021-09-20 | End: 2021-09-20 | Stop reason: HOSPADM

## 2021-09-20 RX ORDER — OXYCODONE HYDROCHLORIDE 5 MG/1
10 TABLET ORAL
Status: DISCONTINUED | OUTPATIENT
Start: 2021-09-20 | End: 2021-09-21 | Stop reason: HOSPADM

## 2021-09-20 RX ORDER — ASCORBIC ACID 500 MG
1000 TABLET ORAL DAILY
Status: DISCONTINUED | OUTPATIENT
Start: 2021-09-21 | End: 2021-09-21 | Stop reason: HOSPADM

## 2021-09-20 RX ORDER — MORPHINE SULFATE 2 MG/ML
2 INJECTION, SOLUTION INTRAMUSCULAR; INTRAVENOUS
Status: DISCONTINUED | OUTPATIENT
Start: 2021-09-20 | End: 2021-09-20 | Stop reason: HOSPADM

## 2021-09-20 RX ORDER — SODIUM CHLORIDE 9 MG/ML
125 INJECTION, SOLUTION INTRAVENOUS CONTINUOUS
Status: DISCONTINUED | OUTPATIENT
Start: 2021-09-20 | End: 2021-09-21 | Stop reason: HOSPADM

## 2021-09-20 RX ORDER — DIPHENHYDRAMINE HYDROCHLORIDE 50 MG/ML
12.5 INJECTION, SOLUTION INTRAMUSCULAR; INTRAVENOUS
Status: DISCONTINUED | OUTPATIENT
Start: 2021-09-20 | End: 2021-09-21 | Stop reason: HOSPADM

## 2021-09-20 RX ORDER — KETOROLAC TROMETHAMINE 30 MG/ML
15 INJECTION, SOLUTION INTRAMUSCULAR; INTRAVENOUS EVERY 6 HOURS
Status: DISCONTINUED | OUTPATIENT
Start: 2021-09-21 | End: 2021-09-21 | Stop reason: HOSPADM

## 2021-09-20 RX ORDER — SODIUM CHLORIDE 0.9 % (FLUSH) 0.9 %
5-40 SYRINGE (ML) INJECTION AS NEEDED
Status: DISCONTINUED | OUTPATIENT
Start: 2021-09-20 | End: 2021-09-20 | Stop reason: HOSPADM

## 2021-09-20 RX ORDER — FENTANYL CITRATE 50 UG/ML
INJECTION, SOLUTION INTRAMUSCULAR; INTRAVENOUS AS NEEDED
Status: DISCONTINUED | OUTPATIENT
Start: 2021-09-20 | End: 2021-09-20 | Stop reason: HOSPADM

## 2021-09-20 RX ORDER — NEOSTIGMINE METHYLSULFATE 1 MG/ML
INJECTION, SOLUTION INTRAVENOUS AS NEEDED
Status: DISCONTINUED | OUTPATIENT
Start: 2021-09-20 | End: 2021-09-20 | Stop reason: HOSPADM

## 2021-09-20 RX ORDER — HYDROMORPHONE HYDROCHLORIDE 1 MG/ML
INJECTION, SOLUTION INTRAMUSCULAR; INTRAVENOUS; SUBCUTANEOUS AS NEEDED
Status: DISCONTINUED | OUTPATIENT
Start: 2021-09-20 | End: 2021-09-20 | Stop reason: HOSPADM

## 2021-09-20 RX ORDER — POLYETHYLENE GLYCOL 3350 17 G/17G
17 POWDER, FOR SOLUTION ORAL DAILY
Status: DISCONTINUED | OUTPATIENT
Start: 2021-09-21 | End: 2021-09-21 | Stop reason: HOSPADM

## 2021-09-20 RX ORDER — MELATONIN
1000 DAILY
Status: DISCONTINUED | OUTPATIENT
Start: 2021-09-21 | End: 2021-09-21 | Stop reason: HOSPADM

## 2021-09-20 RX ORDER — OXYCODONE AND ACETAMINOPHEN 5; 325 MG/1; MG/1
1 TABLET ORAL AS NEEDED
Status: DISCONTINUED | OUTPATIENT
Start: 2021-09-20 | End: 2021-09-20 | Stop reason: HOSPADM

## 2021-09-20 RX ORDER — OXYCODONE HYDROCHLORIDE 5 MG/1
5 TABLET ORAL
Status: DISCONTINUED | OUTPATIENT
Start: 2021-09-20 | End: 2021-09-21 | Stop reason: HOSPADM

## 2021-09-20 RX ORDER — PROPOFOL 10 MG/ML
INJECTION, EMULSION INTRAVENOUS
Status: DISCONTINUED | OUTPATIENT
Start: 2021-09-20 | End: 2021-09-20 | Stop reason: HOSPADM

## 2021-09-20 RX ORDER — ACETAMINOPHEN 325 MG/1
650 TABLET ORAL ONCE
Status: COMPLETED | OUTPATIENT
Start: 2021-09-20 | End: 2021-09-20

## 2021-09-20 RX ORDER — MIDAZOLAM HYDROCHLORIDE 1 MG/ML
0.5 INJECTION, SOLUTION INTRAMUSCULAR; INTRAVENOUS
Status: DISCONTINUED | OUTPATIENT
Start: 2021-09-20 | End: 2021-09-20 | Stop reason: HOSPADM

## 2021-09-20 RX ORDER — DIAZEPAM 10 MG/2ML
5 INJECTION INTRAMUSCULAR
Status: COMPLETED | OUTPATIENT
Start: 2021-09-20 | End: 2021-09-20

## 2021-09-20 RX ORDER — ONDANSETRON 2 MG/ML
4 INJECTION INTRAMUSCULAR; INTRAVENOUS
Status: DISCONTINUED | OUTPATIENT
Start: 2021-09-20 | End: 2021-09-21 | Stop reason: HOSPADM

## 2021-09-20 RX ORDER — LIDOCAINE HYDROCHLORIDE 20 MG/ML
INJECTION, SOLUTION EPIDURAL; INFILTRATION; INTRACAUDAL; PERINEURAL AS NEEDED
Status: DISCONTINUED | OUTPATIENT
Start: 2021-09-20 | End: 2021-09-20 | Stop reason: HOSPADM

## 2021-09-20 RX ORDER — ONDANSETRON 2 MG/ML
4 INJECTION INTRAMUSCULAR; INTRAVENOUS AS NEEDED
Status: DISCONTINUED | OUTPATIENT
Start: 2021-09-20 | End: 2021-09-20 | Stop reason: HOSPADM

## 2021-09-20 RX ORDER — ENOXAPARIN SODIUM 150 MG/ML
150 INJECTION SUBCUTANEOUS DAILY
Status: ON HOLD | COMMUNITY
Start: 2021-09-10 | End: 2021-09-21 | Stop reason: SDUPTHER

## 2021-09-20 RX ORDER — SUCCINYLCHOLINE CHLORIDE 20 MG/ML
INJECTION INTRAMUSCULAR; INTRAVENOUS AS NEEDED
Status: DISCONTINUED | OUTPATIENT
Start: 2021-09-20 | End: 2021-09-20 | Stop reason: HOSPADM

## 2021-09-20 RX ORDER — MIDAZOLAM HYDROCHLORIDE 1 MG/ML
1 INJECTION, SOLUTION INTRAMUSCULAR; INTRAVENOUS AS NEEDED
Status: DISCONTINUED | OUTPATIENT
Start: 2021-09-20 | End: 2021-09-20 | Stop reason: HOSPADM

## 2021-09-20 RX ORDER — DIAZEPAM 10 MG/2ML
INJECTION INTRAMUSCULAR
Status: DISPENSED
Start: 2021-09-20 | End: 2021-09-21

## 2021-09-20 RX ORDER — GLYCOPYRROLATE 0.2 MG/ML
INJECTION INTRAMUSCULAR; INTRAVENOUS AS NEEDED
Status: DISCONTINUED | OUTPATIENT
Start: 2021-09-20 | End: 2021-09-20 | Stop reason: HOSPADM

## 2021-09-20 RX ORDER — SODIUM CHLORIDE 0.9 % (FLUSH) 0.9 %
5-40 SYRINGE (ML) INJECTION AS NEEDED
Status: DISCONTINUED | OUTPATIENT
Start: 2021-09-20 | End: 2021-09-21 | Stop reason: HOSPADM

## 2021-09-20 RX ORDER — LIDOCAINE HYDROCHLORIDE 10 MG/ML
0.1 INJECTION, SOLUTION EPIDURAL; INFILTRATION; INTRACAUDAL; PERINEURAL AS NEEDED
Status: DISCONTINUED | OUTPATIENT
Start: 2021-09-20 | End: 2021-09-20 | Stop reason: HOSPADM

## 2021-09-20 RX ORDER — FENTANYL CITRATE 50 UG/ML
50 INJECTION, SOLUTION INTRAMUSCULAR; INTRAVENOUS AS NEEDED
Status: DISCONTINUED | OUTPATIENT
Start: 2021-09-20 | End: 2021-09-20 | Stop reason: HOSPADM

## 2021-09-20 RX ORDER — DIPHENHYDRAMINE HYDROCHLORIDE 50 MG/ML
12.5 INJECTION, SOLUTION INTRAMUSCULAR; INTRAVENOUS AS NEEDED
Status: DISCONTINUED | OUTPATIENT
Start: 2021-09-20 | End: 2021-09-20 | Stop reason: HOSPADM

## 2021-09-20 RX ORDER — DEXAMETHASONE SODIUM PHOSPHATE 4 MG/ML
INJECTION, SOLUTION INTRA-ARTICULAR; INTRALESIONAL; INTRAMUSCULAR; INTRAVENOUS; SOFT TISSUE AS NEEDED
Status: DISCONTINUED | OUTPATIENT
Start: 2021-09-20 | End: 2021-09-20 | Stop reason: HOSPADM

## 2021-09-20 RX ORDER — CYCLOBENZAPRINE HCL 10 MG
10 TABLET ORAL
Status: DISCONTINUED | OUTPATIENT
Start: 2021-09-20 | End: 2021-09-21 | Stop reason: HOSPADM

## 2021-09-20 RX ORDER — WARFARIN SODIUM 5 MG/1
5 TABLET ORAL DAILY
Status: DISCONTINUED | OUTPATIENT
Start: 2021-09-21 | End: 2021-09-21

## 2021-09-20 RX ORDER — MIDAZOLAM HYDROCHLORIDE 1 MG/ML
INJECTION, SOLUTION INTRAMUSCULAR; INTRAVENOUS AS NEEDED
Status: DISCONTINUED | OUTPATIENT
Start: 2021-09-20 | End: 2021-09-20 | Stop reason: HOSPADM

## 2021-09-20 RX ORDER — FACIAL-BODY WIPES
10 EACH TOPICAL DAILY PRN
Status: DISCONTINUED | OUTPATIENT
Start: 2021-09-22 | End: 2021-09-21 | Stop reason: HOSPADM

## 2021-09-20 RX ORDER — SODIUM CHLORIDE 0.9 % (FLUSH) 0.9 %
5-40 SYRINGE (ML) INJECTION EVERY 8 HOURS
Status: DISCONTINUED | OUTPATIENT
Start: 2021-09-20 | End: 2021-09-21 | Stop reason: HOSPADM

## 2021-09-20 RX ORDER — PROPOFOL 10 MG/ML
INJECTION, EMULSION INTRAVENOUS AS NEEDED
Status: DISCONTINUED | OUTPATIENT
Start: 2021-09-20 | End: 2021-09-20 | Stop reason: HOSPADM

## 2021-09-20 RX ADMIN — NEOSTIGMINE METHYLSULFATE 3 MG: 1 INJECTION, SOLUTION INTRAVENOUS at 17:29

## 2021-09-20 RX ADMIN — GABAPENTIN 100 MG: 100 CAPSULE ORAL at 23:00

## 2021-09-20 RX ADMIN — MIDAZOLAM 0.5 MG: 1 INJECTION INTRAMUSCULAR; INTRAVENOUS at 20:50

## 2021-09-20 RX ADMIN — SUGAMMADEX 40 MG: 100 INJECTION, SOLUTION INTRAVENOUS at 16:50

## 2021-09-20 RX ADMIN — FENTANYL CITRATE 25 MCG: 50 INJECTION INTRAMUSCULAR; INTRAVENOUS at 18:56

## 2021-09-20 RX ADMIN — DEXAMETHASONE SODIUM PHOSPHATE 8 MG: 4 INJECTION, SOLUTION INTRAMUSCULAR; INTRAVENOUS at 15:59

## 2021-09-20 RX ADMIN — WATER 2 G: 1 INJECTION INTRAMUSCULAR; INTRAVENOUS; SUBCUTANEOUS at 23:01

## 2021-09-20 RX ADMIN — HYDROMORPHONE HYDROCHLORIDE 0.5 MG: 1 INJECTION, SOLUTION INTRAMUSCULAR; INTRAVENOUS; SUBCUTANEOUS at 19:35

## 2021-09-20 RX ADMIN — FENTANYL CITRATE 25 MCG: 50 INJECTION INTRAMUSCULAR; INTRAVENOUS at 19:15

## 2021-09-20 RX ADMIN — DEXMEDETOMIDINE HYDROCHLORIDE 4 MCG: 100 INJECTION, SOLUTION, CONCENTRATE INTRAVENOUS at 16:36

## 2021-09-20 RX ADMIN — HYDROMORPHONE HYDROCHLORIDE 0.3 MG: 1 INJECTION, SOLUTION INTRAMUSCULAR; INTRAVENOUS; SUBCUTANEOUS at 17:15

## 2021-09-20 RX ADMIN — ONDANSETRON 4 MG: 2 INJECTION INTRAMUSCULAR; INTRAVENOUS at 19:55

## 2021-09-20 RX ADMIN — MIDAZOLAM 1 MG: 1 INJECTION INTRAMUSCULAR; INTRAVENOUS at 19:02

## 2021-09-20 RX ADMIN — WATER 2 G: 1 INJECTION INTRAMUSCULAR; INTRAVENOUS; SUBCUTANEOUS at 16:00

## 2021-09-20 RX ADMIN — SUCCINYLCHOLINE CHLORIDE 200 MG: 20 INJECTION, SOLUTION INTRAMUSCULAR; INTRAVENOUS at 15:55

## 2021-09-20 RX ADMIN — PHENYLEPHRINE HYDROCHLORIDE 40 MCG/MIN: 10 INJECTION INTRAVENOUS at 16:23

## 2021-09-20 RX ADMIN — MEPERIDINE HYDROCHLORIDE 12.5 MG: 25 INJECTION INTRAMUSCULAR; INTRAVENOUS; SUBCUTANEOUS at 19:29

## 2021-09-20 RX ADMIN — GLYCOPYRROLATE 0.4 MG: 0.2 INJECTION, SOLUTION INTRAMUSCULAR; INTRAVENOUS at 17:29

## 2021-09-20 RX ADMIN — ONDANSETRON HYDROCHLORIDE 4 MG: 2 INJECTION, SOLUTION INTRAMUSCULAR; INTRAVENOUS at 16:04

## 2021-09-20 RX ADMIN — PROPOFOL 75 MCG/KG/MIN: 10 INJECTION, EMULSION INTRAVENOUS at 16:09

## 2021-09-20 RX ADMIN — DEXMEDETOMIDINE HYDROCHLORIDE 4 MCG: 100 INJECTION, SOLUTION, CONCENTRATE INTRAVENOUS at 16:35

## 2021-09-20 RX ADMIN — FENTANYL CITRATE 50 MCG: 50 INJECTION, SOLUTION INTRAMUSCULAR; INTRAVENOUS at 15:48

## 2021-09-20 RX ADMIN — PROPOFOL 200 MG: 10 INJECTION, EMULSION INTRAVENOUS at 15:55

## 2021-09-20 RX ADMIN — SODIUM CHLORIDE, POTASSIUM CHLORIDE, SODIUM LACTATE AND CALCIUM CHLORIDE: 600; 310; 30; 20 INJECTION, SOLUTION INTRAVENOUS at 15:49

## 2021-09-20 RX ADMIN — HYDROMORPHONE HYDROCHLORIDE 0.4 MG: 1 INJECTION, SOLUTION INTRAMUSCULAR; INTRAVENOUS; SUBCUTANEOUS at 18:37

## 2021-09-20 RX ADMIN — LIDOCAINE HYDROCHLORIDE 80 MG: 20 INJECTION, SOLUTION EPIDURAL; INFILTRATION; INTRACAUDAL; PERINEURAL at 15:55

## 2021-09-20 RX ADMIN — Medication 10 ML: at 23:02

## 2021-09-20 RX ADMIN — HYDROMORPHONE HYDROCHLORIDE: 10 INJECTION INTRAMUSCULAR; INTRAVENOUS; SUBCUTANEOUS at 19:41

## 2021-09-20 RX ADMIN — KETAMINE HYDROCHLORIDE 50 MG: 10 INJECTION, SOLUTION INTRAMUSCULAR; INTRAVENOUS at 15:55

## 2021-09-20 RX ADMIN — MIDAZOLAM 0.5 MG: 1 INJECTION INTRAMUSCULAR; INTRAVENOUS at 20:55

## 2021-09-20 RX ADMIN — KETOROLAC TROMETHAMINE 15 MG: 30 INJECTION, SOLUTION INTRAMUSCULAR; INTRAVENOUS at 23:00

## 2021-09-20 RX ADMIN — HYDROMORPHONE HYDROCHLORIDE 0.5 MG: 1 INJECTION, SOLUTION INTRAMUSCULAR; INTRAVENOUS; SUBCUTANEOUS at 19:00

## 2021-09-20 RX ADMIN — MIDAZOLAM 2 MG: 1 INJECTION INTRAMUSCULAR; INTRAVENOUS at 15:48

## 2021-09-20 RX ADMIN — ROCURONIUM BROMIDE 35 MG: 10 SOLUTION INTRAVENOUS at 15:59

## 2021-09-20 RX ADMIN — ACETAMINOPHEN 1000 MG: 500 TABLET ORAL at 23:00

## 2021-09-20 RX ADMIN — FENTANYL CITRATE 50 MCG: 50 INJECTION INTRAMUSCULAR; INTRAVENOUS at 19:04

## 2021-09-20 RX ADMIN — SODIUM CHLORIDE 125 ML/HR: 9 INJECTION, SOLUTION INTRAVENOUS at 23:09

## 2021-09-20 RX ADMIN — HYDROMORPHONE HYDROCHLORIDE 0.3 MG: 1 INJECTION, SOLUTION INTRAMUSCULAR; INTRAVENOUS; SUBCUTANEOUS at 18:40

## 2021-09-20 RX ADMIN — ROCURONIUM BROMIDE 5 MG: 10 SOLUTION INTRAVENOUS at 15:55

## 2021-09-20 RX ADMIN — FENTANYL CITRATE 50 MCG: 50 INJECTION, SOLUTION INTRAMUSCULAR; INTRAVENOUS at 15:55

## 2021-09-20 RX ADMIN — ACETAMINOPHEN 650 MG: 325 TABLET ORAL at 15:03

## 2021-09-20 RX ADMIN — DIAZEPAM 5 MG: 5 INJECTION, SOLUTION INTRAMUSCULAR; INTRAVENOUS at 19:25

## 2021-09-20 RX ADMIN — DEXMEDETOMIDINE HYDROCHLORIDE 4 MCG: 100 INJECTION, SOLUTION, CONCENTRATE INTRAVENOUS at 16:37

## 2021-09-20 NOTE — ANESTHESIA PREPROCEDURE EVALUATION
Relevant Problems   No relevant active problems       Anesthetic History   No history of anesthetic complications            Review of Systems / Medical History  Patient summary reviewed, nursing notes reviewed and pertinent labs reviewed    Pulmonary  Within defined limits                 Neuro/Psych   Within defined limits           Cardiovascular  Within defined limits                     GI/Hepatic/Renal  Within defined limits              Endo/Other  Within defined limits           Other Findings   Comments: Factor 5 leiden  Hx PE           Physical Exam    Airway  Mallampati: II  TM Distance: > 6 cm  Neck ROM: normal range of motion   Mouth opening: Normal     Cardiovascular  Regular rate and rhythm,  S1 and S2 normal,  no murmur, click, rub, or gallop             Dental  No notable dental hx       Pulmonary  Breath sounds clear to auscultation               Abdominal  GI exam deferred       Other Findings            Anesthetic Plan    ASA: 2  Anesthesia type: general          Induction: Intravenous  Anesthetic plan and risks discussed with: Patient

## 2021-09-20 NOTE — PERIOP NOTES
Per patient's request called wife to notify her that patient will be staying overnight tonight per Dr. Harmony Rivera. Wife verbalized understanding. Patient updated after speaking with wife.      Kehinde Grewal RN

## 2021-09-20 NOTE — BRIEF OP NOTE
Brief Postoperative Note    Patient: Oriana Joe  YOB: 1960  MRN: [id number]    Date of Procedure: 9/20/2021     Pre-Op Diagnosis: STENOSIS; DEGENERATIVE DISC DISEASE    Post-Op Diagnosis: Same as preoperative diagnosis. Procedure(s): MINIMALLY INVASIVE SURGERY TRANSFORAMINAL LUMBAR INTERBODY FUSION WITH ROBOTICS (MAZOR) (O-ARM)    Surgeon(s):  Aliyah Law MD    Surgical Assistant: Physician Assistant: Oriana Joe PA-C    Anesthesia: General     Estimated Blood Loss (mL): less than 894     Complications: None    Specimens: * No specimens in log *     Implants:   Implant Name Type Inv. Item Serial No.  Lot No. LRB No. Used Action   GRAFT BNE SUB M [id number] COMPRESSIBLE SPNG STRP PROVIDE - [id number]  GRAFT BNE SUB M [id number] COMPRESSIBLE SPNG STRP PROVIDE [id number] [employer]_WD [id number] N/A 1 Implanted   KIT BNE GRFT XSM 1.4CC RHBMP-2 ABSRB CLLGN SPNG INFUSE - SNA  KIT BNE GRFT XSM 1.4CC RHBMP-2 ABSRB CLLGN SPNG INFUSE NA [employer] SOFAMOR DANEK_WD [id number] N/A 1 Implanted   SET SCR SPNL 55 6MM RHEA FEN MULTIAXIAL [address] SOLERA VOYAGER - SNA  SET SCR SPNL 55 6MM RHEA FEN MULTIAXIAL [address] SOLERA VOYAGER NA [employer] [place] [employer]_WD NA N/A 4 Implanted   SCREW SPNL L45MM DIA6. 5MM HI STRENGTH LO PROF MULTIAXIAL - SNA  SCREW SPNL L45MM DIA6. 5MM HI STRENGTH LO PROF MULTIAXIAL NA [employer] SOFAMOR DANEK_WD NA N/A 2 Implanted   SCREW SPNL L40MM OD75MM CO CHROME TI POST THORACOLUMBOSACRAL - SNA  SCREW SPNL L40MM OD75MM CO CHROME TI POST THORACOLUMBOSACRAL NA [employer] SOFAMOR DANEK_WD NA N/A 1 Implanted   SPACER SPNL [id number] LUM SACR PEEK INTBDY FUS W/ TANT - SNA  SPACER SPNL [id number] LUM SACR PEEK INTBDY FUS W/ TANT NA [employer] SOF[employer]OR [employer]ANEK[employer]WD [id number] N/A 1 Implanted   MARIAELENA SPNL CAPPED 5.5X35 MM COCR CD NOREEN HAYNES - SNA  MARIAELENA SPNL CAPPED 5.5X35 MM COCR CD NOREEN HAYNES NA [employer] [place] [employer]WD NA N/A 1 Implanted       Drains: * No LDAs found *    Findings: stenosis    Electronically Signed by Bisi Bailey PA-C on 9/20/2021 at 6:23 PM

## 2021-09-20 NOTE — ANESTHESIA POSTPROCEDURE EVALUATION
Procedure(s): MINIMALLY INVASIVE SURGERY TRANSFORAMINAL LUMBAR INTERBODY FUSION WITH ROBOTICS (MAZOR) (O-ARM). general    Anesthesia Post Evaluation      Multimodal analgesia: multimodal analgesia used between 6 hours prior to anesthesia start to PACU discharge  Patient location during evaluation: bedside  Patient participation: complete - patient participated  Level of consciousness: awake and responsive to verbal stimuli  Pain score: 3  Pain management: satisfactory to patient  Airway patency: patent  Anesthetic complications: no  Cardiovascular status: acceptable and blood pressure returned to baseline  Respiratory status: acceptable  Hydration status: acceptable  Comments: I have evaluated the patient and meets criteria for discharge from PACU. Filiberto Granados DO. Post anesthesia nausea and vomiting:  none  Final Post Anesthesia Temperature Assessment:  Normothermia (36.0-37.5 degrees C)      INITIAL Post-op Vital signs:   Vitals Value Taken Time   /94 09/20/21 1900   Temp 36.6 °C (97.8 °F) 09/20/21 1840   Pulse 54 09/20/21 1909   Resp 10 09/20/21 1909   SpO2 93 % 09/20/21 1909   Vitals shown include unvalidated device data.

## 2021-09-21 ENCOUNTER — HOME HEALTH ADMISSION (OUTPATIENT)
Dept: HOME HEALTH SERVICES | Facility: HOME HEALTH | Age: 61
End: 2021-09-21
Payer: COMMERCIAL

## 2021-09-21 VITALS
RESPIRATION RATE: 18 BRPM | BODY MASS INDEX: 30.76 KG/M2 | SYSTOLIC BLOOD PRESSURE: 115 MMHG | WEIGHT: 207.67 LBS | TEMPERATURE: 97.8 F | HEIGHT: 69 IN | OXYGEN SATURATION: 92 % | HEART RATE: 57 BPM | DIASTOLIC BLOOD PRESSURE: 70 MMHG

## 2021-09-21 LAB
ANION GAP SERPL CALC-SCNC: 5 MMOL/L (ref 5–15)
BUN SERPL-MCNC: 21 MG/DL (ref 6–20)
BUN/CREAT SERPL: 16 (ref 12–20)
CALCIUM SERPL-MCNC: 8.1 MG/DL (ref 8.5–10.1)
CHLORIDE SERPL-SCNC: 109 MMOL/L (ref 97–108)
CO2 SERPL-SCNC: 23 MMOL/L (ref 21–32)
CREAT SERPL-MCNC: 1.35 MG/DL (ref 0.7–1.3)
GLUCOSE SERPL-MCNC: 126 MG/DL (ref 65–100)
HGB BLD-MCNC: 14.5 G/DL (ref 12.1–17)
INR PPP: 1 (ref 0.9–1.1)
POTASSIUM SERPL-SCNC: 5.1 MMOL/L (ref 3.5–5.1)
PROTHROMBIN TIME: 10.8 SEC (ref 9–11.1)
SODIUM SERPL-SCNC: 137 MMOL/L (ref 136–145)

## 2021-09-21 PROCEDURE — 97165 OT EVAL LOW COMPLEX 30 MIN: CPT

## 2021-09-21 PROCEDURE — 80048 BASIC METABOLIC PNL TOTAL CA: CPT

## 2021-09-21 PROCEDURE — 74011250636 HC RX REV CODE- 250/636: Performed by: ORTHOPAEDIC SURGERY

## 2021-09-21 PROCEDURE — 36415 COLL VENOUS BLD VENIPUNCTURE: CPT

## 2021-09-21 PROCEDURE — 97535 SELF CARE MNGMENT TRAINING: CPT

## 2021-09-21 PROCEDURE — 85610 PROTHROMBIN TIME: CPT

## 2021-09-21 PROCEDURE — L0628 LSO FLEX NO RI STAYS PRE OTS: HCPCS

## 2021-09-21 PROCEDURE — 85018 HEMOGLOBIN: CPT

## 2021-09-21 PROCEDURE — 74011250637 HC RX REV CODE- 250/637: Performed by: PHYSICIAN ASSISTANT

## 2021-09-21 PROCEDURE — 77010033678 HC OXYGEN DAILY

## 2021-09-21 PROCEDURE — 97116 GAIT TRAINING THERAPY: CPT

## 2021-09-21 PROCEDURE — 74011250636 HC RX REV CODE- 250/636: Performed by: PHYSICIAN ASSISTANT

## 2021-09-21 PROCEDURE — 94760 N-INVAS EAR/PLS OXIMETRY 1: CPT

## 2021-09-21 PROCEDURE — 97161 PT EVAL LOW COMPLEX 20 MIN: CPT

## 2021-09-21 PROCEDURE — 74011000250 HC RX REV CODE- 250: Performed by: PHYSICIAN ASSISTANT

## 2021-09-21 RX ORDER — DIAZEPAM 10 MG/2ML
5 INJECTION INTRAMUSCULAR ONCE
Status: COMPLETED | OUTPATIENT
Start: 2021-09-21 | End: 2021-09-21

## 2021-09-21 RX ORDER — TRAMADOL HYDROCHLORIDE 50 MG/1
50 TABLET ORAL ONCE
Status: COMPLETED | OUTPATIENT
Start: 2021-09-21 | End: 2021-09-21

## 2021-09-21 RX ORDER — TRAMADOL HYDROCHLORIDE 50 MG/1
50 TABLET ORAL
Status: CANCELLED | OUTPATIENT
Start: 2021-09-21

## 2021-09-21 RX ORDER — GABAPENTIN 300 MG/1
300 CAPSULE ORAL 3 TIMES DAILY
Status: DISCONTINUED | OUTPATIENT
Start: 2021-09-21 | End: 2021-09-21 | Stop reason: HOSPADM

## 2021-09-21 RX ORDER — TRAMADOL HYDROCHLORIDE 50 MG/1
50 TABLET ORAL
Qty: 40 TABLET | Refills: 0 | Status: SHIPPED | OUTPATIENT
Start: 2021-09-21 | End: 2021-10-05

## 2021-09-21 RX ORDER — HYDROMORPHONE HYDROCHLORIDE 2 MG/1
2 TABLET ORAL
Qty: 60 TABLET | Refills: 0 | Status: SHIPPED | OUTPATIENT
Start: 2021-09-21 | End: 2021-10-05

## 2021-09-21 RX ORDER — ENOXAPARIN SODIUM 150 MG/ML
150 INJECTION SUBCUTANEOUS DAILY
Status: DISCONTINUED | OUTPATIENT
Start: 2021-09-21 | End: 2021-09-21 | Stop reason: HOSPADM

## 2021-09-21 RX ORDER — ENOXAPARIN SODIUM 150 MG/ML
150 INJECTION SUBCUTANEOUS DAILY
Qty: 5 EACH | Refills: 0 | Status: SHIPPED | OUTPATIENT
Start: 2021-09-21 | End: 2021-09-26

## 2021-09-21 RX ORDER — ONDANSETRON 4 MG/1
4 TABLET, ORALLY DISINTEGRATING ORAL
Qty: 30 TABLET | Refills: 0 | Status: SHIPPED | OUTPATIENT
Start: 2021-09-21

## 2021-09-21 RX ORDER — NALOXONE HYDROCHLORIDE 4 MG/.1ML
SPRAY NASAL
Qty: 1 EACH | Refills: 0 | Status: SHIPPED | OUTPATIENT
Start: 2021-09-21

## 2021-09-21 RX ADMIN — ACETAMINOPHEN 1000 MG: 500 TABLET ORAL at 05:34

## 2021-09-21 RX ADMIN — TRAMADOL HYDROCHLORIDE 50 MG: 50 TABLET ORAL at 15:13

## 2021-09-21 RX ADMIN — Medication 1000 UNITS: at 09:06

## 2021-09-21 RX ADMIN — SODIUM CHLORIDE 125 ML/HR: 9 INJECTION, SOLUTION INTRAVENOUS at 03:21

## 2021-09-21 RX ADMIN — CYCLOBENZAPRINE 10 MG: 10 TABLET, FILM COATED ORAL at 11:41

## 2021-09-21 RX ADMIN — OXYCODONE HYDROCHLORIDE AND ACETAMINOPHEN 1000 MG: 500 TABLET ORAL at 09:06

## 2021-09-21 RX ADMIN — KETOROLAC TROMETHAMINE 15 MG: 30 INJECTION, SOLUTION INTRAMUSCULAR; INTRAVENOUS at 05:34

## 2021-09-21 RX ADMIN — OXYCODONE 10 MG: 5 TABLET ORAL at 10:18

## 2021-09-21 RX ADMIN — DIAZEPAM 5 MG: 5 INJECTION, SOLUTION INTRAMUSCULAR; INTRAVENOUS at 04:15

## 2021-09-21 RX ADMIN — POLYETHYLENE GLYCOL 3350 17 G: 17 POWDER, FOR SOLUTION ORAL at 09:06

## 2021-09-21 RX ADMIN — Medication 10 ML: at 05:34

## 2021-09-21 RX ADMIN — ACETAMINOPHEN 1000 MG: 500 TABLET ORAL at 10:18

## 2021-09-21 RX ADMIN — ENOXAPARIN SODIUM 150 MG: 150 INJECTION SUBCUTANEOUS at 10:18

## 2021-09-21 RX ADMIN — SENNOSIDES AND DOCUSATE SODIUM 1 TABLET: 50; 8.6 TABLET ORAL at 09:06

## 2021-09-21 RX ADMIN — GABAPENTIN 300 MG: 300 CAPSULE ORAL at 10:18

## 2021-09-21 RX ADMIN — WATER 2 G: 1 INJECTION INTRAMUSCULAR; INTRAVENOUS; SUBCUTANEOUS at 07:00

## 2021-09-21 RX ADMIN — Medication 10 ML: at 11:41

## 2021-09-21 RX ADMIN — KETOROLAC TROMETHAMINE 15 MG: 30 INJECTION, SOLUTION INTRAMUSCULAR; INTRAVENOUS at 11:41

## 2021-09-21 NOTE — PROGRESS NOTES
Patient is doing well. Pain is well-controlled. He has cleared PT/OT. Voiding without issue. No nausea or vomiting. Incision C/D/I. All questions answered. Plan d/w Dr. Barbara Guzman. Follow up in office in 2 weeks, sooner if needed.

## 2021-09-21 NOTE — OP NOTES
[facility]  OPERATIVE REPORT    Name:  Nikolas Morrow  MR#:  [id number]  :  1960  ACCOUNT #:  [de-identified]  DATE OF SERVICE:  2021      PREOPERATIVE DIAGNOSES:  Lumbar spondylosis, lumbar stenosis L5-S1, chronic lower back pain with bilateral radicular symptoms. POSTOPERATIVE DIAGNOSES:  Lumbar spondylosis, lumbar stenosis L5-S1, chronic lower back pain with bilateral radicular symptoms. PROCEDURES PERFORMED:  Lumbar fusion L5-S1 with posterior segmental instrumentation, L5-S1 transforaminal interbody fusion, robotic guidance of pedicle screw placement. SURGEON:  Siddharth Brown MD    ASSISTANT:  Ngoc Bonner PA-C    ANESTHESIA:  General.    COMPLICATIONS:  None. SPECIMENS REMOVED:  None. IMPLANTS:  Medtronic Voyager Screws and Medtronic Elevate Graft. ESTIMATED BLOOD LOSS:  Minimal.    INDICATIONS:  This is a very pleasant 75-year-old gentleman with chronic history of severe lower back pain with progressive spondylosis and degeneration with bilateral radicular symptoms. He had failed significant conservative treatment and is for minimally invasive posterior lumbar fusion for stabilization. PROCEDURE:  The patient was identified, brought to the operative suite, underwent general anesthesia without difficulty. Preoperative neuromonitoring was placed, baselines obtained and these remained stable throughout the surgical teenager. 2 g of Ancef given to the patient preoperatively. The patient was [facility] all bony prominence. The Evermindor X robotic system was attached to the right hand side and also draped out sterilely. After time-out was confirmed, we then placed a PSIS pin on the right-hand side and attached the Evermindor X robotic system to this PSIS pin. We then performed a predefined scan of the surgical field followed by placement of the end effector arm into the snapshot position.   This was then used to synchronize the navigation component of the system to the end effector arm. We brought the Star marker into the midportion of the wound and then placed a sterile drape over the entire surgical field and then brought in the Medtronic O2 O-arm. We did an intraoperative spin and we were able to obtain a 3-D image of the lumbar sacral spine. At which time, we then planned out pedicle screw trajectories for percutaneous, especially the L5-S1. We then did a mini Ricky approach bilaterally starting on the right-hand side with a fascial split and blunt dissection. We decorticated the facet joint of L5-S1 with a Midas bur that was navigated and then using a standard technique of robotic guided and navigated confirmation, we did a drill of the  hole to start the pedicle screw followed by tapping and then screw placement with placement of 6.5 x 45 mm Medtronic larger percutaneous screws. This was at L5 and at S1, we placed a 7.5 x 45 mm screw. We then moved to the left hand side and did a similar technique through a mini Ricky incision and then after placement of both screws, the Nspace retractor was then placed and they were able to distract across the disk space. The facet capsule was taken down and we removed the inferior articular process using a combination of Midas bur and a rongeur and this allowed access to the lateral aspect of the disk space. We were able to visualize exiting L5 nerve root as well as the superior portion of the superior articular process which was also undercut for decompression. We then did an annulotomy and then using the navigated decompression tools, we did a complete diskectomy of the L5-S1 disk space and distracted the space back to approximate 11 mm height. We rasped the endplates to lightly bleeding bone. We then placed a small OsteoAMP sponge in the anterior one-third column followed by placement of an oblique long Medtronic Elevate interbody graft across the disk space.   This was opened to 11.6 mm height. A good distraction of the foraminal height was maintained. Neuromonitoring was stable. We irrigated both wounds with Irrisept irrigation followed by pulse irrigation. Vancomycin powder into the wound. We then attached 35 mm rods percutaneously into each one of the Voyager towers and followed by the setscrews and final tightening. Final x-rays demonstrated stable fixation with good alignment. Wounds thoroughly irrigated. At which time, we then did a #1 Stratafix on the fascial layer, 2-0 Stratafix on the subcutaneous layer and ZipLine on the skin. Pain solution infiltrated into the musculature afterwards. The patient returned to the PACU in stable condition. The physician assistant was present during the entire operative procedure and assisted in all critical elements of the surgery. No surgical assist or resident was available.      Mason Hutchins MD      [id number]  D:  09/21/2021 14:11  T:  09/21/2021 17:28  JOB #:  [id number]

## 2021-09-21 NOTE — PROGRESS NOTES
Problem: Falls - Risk of  Goal: *Absence of Falls  Description: Document Solis Candelario Fall Risk and appropriate interventions in the flowsheet.   9/21/2021 1649 by Ayush Torre RN  Outcome: Resolved/Met  Note: Fall Risk Interventions:            Medication Interventions: Teach patient to arise slowly         History of Falls Interventions: Room close to nurse's station      9/21/2021 1211 by Ayush Torre RN  Outcome: Progressing Towards Goal  Note: Fall Risk Interventions:            Medication Interventions: Teach patient to arise slowly         History of Falls Interventions: Room close to nurse's station         Problem: Patient Education: Go to Patient Education Activity  Goal: Patient/Family Education  Outcome: Resolved/Met     Problem: Patient Education: Go to Patient Education Activity  Goal: Patient/Family Education  Outcome: Resolved/Met

## 2021-09-21 NOTE — DISCHARGE INSTRUCTIONS
After Hospital Care Plan:  Discharge Instructions Lumbar Fusion Surgery   Dr. Kenya Gregg   Patient Name: Junie Barone    Date of procedure: 9/20/2021  Date of discharge: 9/21/2021    Procedure: Procedure(s): MINIMALLY INVASIVE SURGERY TRANSFORAMINAL LUMBAR INTERBODY FUSION WITH ROBOTICS (Kevin Underwood) (O-ARM)  PCP: Viv Lang MD    Follow up appointments  -follow up with Dr. Dr. Kenya Gregg in 2 weeks. Call [phone] to make an appointment as soon as you get home from the hospital.    [address] [place] [address] Hwy: ____________________   phone: _______________________  The agency will contact you to arrange dates/times for visits. Please call them if you do not hear from them within 24 hours after you are discharged  Physical therapy 3 times a week for 3 weeks  Nursing-initial assessment and as needed    When to call your Orthopaedic Surgeon:  -Signs of infection-if your incision is red; continues to have drainage; drainage has a foul odor or if you have a persistent fever over 101 degrees for 24 hours  -Nausea or vomiting, severe headache  -Loss of bowel or bladder function, inability to urinate  -Changes in sensation in your arms or legs (numbness, tingling, loss of color)  -Increased weakness-greater than before your surgery  -Severe pain or pain not relieved by medications  -Signs of a blood clot in your leg-calf pain, tenderness, redness, swelling of lower leg    When to call your Primary Care Physician:  -Concerns about medical conditions such as diabetes, high blood pressure, asthma, congestive heart failure  -Call if blood sugars are elevated, persistent headache or dizziness, coughing or congestion, constipation or diarrhea, burning with urination, abnormal heart rate    When to call 911 and go to the nearest emergency room:  -Acute onset of chest pain, shortness of breath, difficulty breathing    Activity  -You are going home a well person, be as active as possible. Your only exercise should be walking. Start with short frequent walks and increase your walking distance each day.  -Limit the amount of time you sit to 20-30 minute intervals. Sitting for prolonged periods of time will be uncomfortable for you following surgery.  -Do NOT lift anything over 5 pounds  -Do NOT do any straining, twisting or bending  -When you are in bed, you may lay on your back or on either side. Do NOT lie on your stomach    Brace  -If you have a back brace, you should wear your brace at all times when you are out of bed. Do not wear the brace while in bed or showering.  -Remember to always wear a cotton t-shirt underneath your brace.  -Do not bend or twist when your brace is off    Diet  -Resume usual diet; drink plenty of fluids; eat foods high in fiber  -It is important to have regular bowel movements. Pain medications may cause constipation. You may want to take a stool softener (such as Senokot-S or Colace) to prevent constipation.   -If constipation occurs, take a laxative (such as Dulcolax tablets, Milk of Magnesia, or a suppository). Laxatives should only be used if the above preventable measures have failed and you still have not had a bowel movement after three days    Driving  -You may not drive or return to work until instructed by your physician. However, you may ride in the car for short periods of time. Incision Care  Your incision has been closed with absorbable sutures and the Zipline skin closure system. This will assist with healing. The Jerrol Axel is to remain on your incision for 2 weeks. A dry dressing (ABD and tape) will be placed over it and should be changed daily, for at least the first several days after your surgery. If you have no incisional drainage, you may leave the incision open to air if you wish, still leaving the Zipline in place. Please make sure to wash your hands prior to touching your dressing. You may take brief showers but do not run the water directly onto the wound.  After your shower, blot your incision dry with a soft towel and replace the dry dressing. Do not allow the tape to come in contact with the Zipline. Do not rub or apply any lotions or ointments to your incision site. Do not soak or scrub your wound. The Zipline dressing will be removed during your two week follow-up appointment. If you experience drainage leaking from underneath the Zipline or if it peels off before 2 weeks, please contact your orthopedic surgeons office. Showering  -You may shower in approximately 4 days after your surgery.    -Leave the dressing on during your shower. Do NOT allow the water to run directly onto your dressing. Once you get out of the shower, gently pat the dressing dry. -Reminder- your brace can be removed while showering. Remember to not bend or twist while your brace is off.    -Do not take a tub bath. Preventing blood clots  -You have been given T.E.D. stockings to wear. Continue to wear these for 7 days after your discharge. Put them on in the morning and take them off at night.    -They are used to increase your circulation and prevent blood clots from forming in your legs  -T. E.D. stockings can be machine washed, temperature not to exceed 160° F (71°C) and machine dried for 15 to 20 minutes, temperature not to exceed 250° F (121°C). Pain management  -Take pain medication as prescribed; decrease the amount you use as your pain lessens  -DO not wait until you are in extreme pain to take your medication.  -Avoid alcoholic beverages while taking pain medication    Pain Medication Safety  DO:  -Read the Medication Guide   -Take your medicine exactly as prescribed   -Store your medicine away from children and in a safe place   -Flush unused medicine down the toilet   -Call your healthcare provider for medical advice about side effects. You may report side effects to FDA at 3-403-FDA-8420.   -Please be aware that many medications contain Tylenol.   We do not want you to over medicate so please read the information below as a guide. Do not take more than 4 Grams of Tylenol in a 24 hour period. (There are 1000 milligrams in one Gram)                                                                                                                                                                                                                                                Percocet contains 325 mg of Tylenol per tablet (do not take more than 12 tablets in 24 hours)  Lortab contains 500 mg of Tylenol per tablet (do not take more than 8 tablets in 24 hours)  Norco contains 325 mg of Tylenol per tablet (do not take more than 12 tablets in 24 hours). DO NOT:  -Do not give your medicine to others   -Do not take medicine unless it was prescribed for you   -Do not stop taking your medicine without talking to your healthcare provider   -Do not break, chew, crush, dissolve, or inject your medicine. If you cannot swallow your medicine whole, talk to your healthcare provider.  -Do not drink alcohol while taking this medicine  -Do not take anti-inflammatory medications or aspirin unless instructed by your physician.

## 2021-09-21 NOTE — PROGRESS NOTES
Problem: Mobility Impaired (Adult and Pediatric)  Goal: *Acute Goals and Plan of Care (Insert Text)  Description: FUNCTIONAL STATUS PRIOR TO ADMISSION: Patient was independent and active without use of DME.    HOME SUPPORT PRIOR TO ADMISSION: The patient lived with family but did not require assist.    Physical Therapy Goals  Initiated 9/21/2021    1. Patient will move from supine to sit and sit to supine , scoot up and down, and roll side to side in bed with modified independence within 4 days. 2. Patient will perform sit to stand with modified independence within 4 days. 3. Patient will ambulate with independence for 300 feet with the least restrictive device within 4 days. 4. Patient will ascend/descend 4 stairs with 1 handrail(s) with supervision/set-up within 4 days. 5. Patient will verbalize and demonstrate understanding of spinal precautions (No bending, lifting greater than 5 lbs, or twisting; log-roll technique; frequent repositioning as instructed) within 4 days. Outcome: Progressing Towards Goal     PHYSICAL THERAPY EVALUATION  Patient: Ilya Yousif (18 y.o. male)  Date: 9/21/2021  Primary Diagnosis: DDD (degenerative disc disease), lumbar [M51.36]  Lumbar stenosis with neurogenic claudication [M48.062]  Procedure(s) (LRB):  MINIMALLY INVASIVE SURGERY TRANSFORAMINAL LUMBAR INTERBODY FUSION WITH ROBOTICS (MAZOR) (O-ARM) (N/A) 1 Day Post-Op   Precautions: falls         ASSESSMENT  Based on the objective data described below, the patient presents with impaired trunk ROM, spinal precautions, mild B LE pain, balance, weakness and gait dysfunction/intolerance causing limited independence with mobility s/p recent DDD with L5-S1 and TLIF POD #1. Pt PLOF: independent with ADLs and IADLs. Patient lives with wife and son in one story home, 4 steps to enter, bilateral rails.    Pt received in supine, min A and cues for log rolling instruction, min assist to don brace, ambulates well with short steps but overall fair with slight shuffling. Pt tolerates well however cues for redirection to task occasionally required. Reviewed back precautions, sitting limit of 30-45 minutes, positioning in chair, and progress. Pt is not cleared for discharge from Physical Therapy standpoint at this time, expect DC this afternoon, recommend HHPT. Will benefit from therapy in acute setting, anticipate will improve tolerance quickly with improved pain control. Current Level of Function Impacting Discharge (mobility/balance): decreased endurance, mild gait dysfuntions    Functional Outcome Measure: The patient scored Total: 100/100 on the Barthel Index which is indicative of low impaired ability to care for basic self needs/dependency on others. Other factors to consider for discharge: controlled     Patient will benefit from skilled therapy intervention to address the above noted impairments. PLAN :  Recommendations and Planned Interventions: bed mobility training, transfer training, gait training, therapeutic exercises, neuromuscular re-education, patient and family training/education and therapeutic activities      Frequency/Duration: Patient will be followed by physical therapy:  twice daily to address goals. Recommendation for discharge: (in order for the patient to meet his/her long term goals)  Physical therapy at least 2 days/week in the home vs none    This discharge recommendation:  Has been made in collaboration with the attending provider and/or case management    IF patient discharges home will need the following DME: brace/splint, given         SUBJECTIVE:   Patient stated I dont want to mess this up.     OBJECTIVE DATA SUMMARY:   HISTORY:    Past Medical History:   Diagnosis Date    Arthritis     Chronic pain     Diverticulitis     [facility] Leiden [facility] [address]     Pulmonary embolism ([facility] [address] 2001     Past Surgical History:   Procedure Laterality Date    HX COLOSTOMY  2006    HX GI COLONOSCOPY    HX GI  2007    COLOSTOMY REVERSED     HX KNEE ARTHROSCOPY Left 2001    X2    HX ORTHOPAEDIC Left 2019    WRIST    HX ROTATOR CUFF REPAIR Left 2006    [place] ABDOMEN SURGERY PROC UNLISTED  2006    PARTIAL COLECTOMY       Personal factors and/or comorbidities impacting plan of care:          EXAMINATION/PRESENTATION/DECISION MAKING:   Critical Behavior:  Neurologic State: Alert  Orientation Level: Oriented X4  Cognition: Appropriate decision making  Safety/Judgement: Awareness of environment  Hearing:     Skin:  intact  Edema: none  Range Of Motion:  AROM: Within functional limits                       Strength:    Strength: Within functional limits                    Tone & Sensation:   Tone: Normal                              Coordination:  Coordination: Within functional limits  Vision:      Functional Mobility:  Bed Mobility:  Rolling: Minimum assistance  Supine to Sit: Minimum assistance     Scooting: Supervision  Transfers:  Sit to Stand: Independent  Stand to Sit: Independent  Stand Pivot Transfers: Independent     Bed to Chair: Independent              Balance:   Sitting: Intact  Standing: Intact; Without support  Ambulation/Gait Training:  Distance (ft): 300 Feet (ft)  Assistive Device: Brace/Splint  Ambulation - Level of Assistance: Supervision     Gait Description (WDL): Exceptions to WDL  Gait Abnormalities: Shuffling gait        Base of Support: Widened     Speed/Michelle: Shuffled; Slow  Step Length: Right shortened;Left shortened       Functional Measure:  Barthel Index:    Bathin  Bladder: 10  Bowels: 10  Groomin  Dressing: 10  Feeding: 10  Mobility: 15  Stairs: 10  Toilet Use: 10  Transfer (Bed to Chair and Back): 15  Total: 100/100       The Barthel ADL Index: Guidelines  1. The index should be used as a record of what a patient does, not as a record of what a patient could do.   2. The main aim is to establish degree of independence from any help, physical or verbal, however minor and for whatever reason. 3. The need for supervision renders the patient not independent. 4. A patient's performance should be established using the best available evidence. Asking the patient, friends/relatives and nurses are the usual sources, but direct observation and common sense are also important. However direct testing is not needed. 5. Usually the patient's performance over the preceding 24-48 hours is important, but occasionally longer periods will be relevant. 6. Middle categories imply that the patient supplies over 50 per cent of the effort. 7. Use of aids to be independent is allowed. Desire Aranda., Barthel, D.W. ([id number]). Functional evaluation: the Barthel Index. [facility] (14)2. JHOAN DossF, Amor Waters., Gokul Baker., [facility], [address] [place] [address] (1999). Measuring the change indisability after inpatient rehabilitation; comparison of the responsiveness of the Barthel Index and Functional [place] Measure. Journal of Neurology, Neurosurgery, and Psychiatry, 66(4), 526-838. ABAD Aceves.DOT, AMELIA Byrnes, & Iveth Leos MMARLY. (2004.) Assessment of post-stroke quality of life in cost-effectiveness studies: The usefulness of the Barthel Index and the EuroQoL-5D.  Quality of Life Research, 15, 827-74        Physical Therapy Evaluation Charge Determination   History Examination Presentation Decision-Making   HIGH Complexity :3+ comorbidities / personal factors will impact the outcome/ POC  HIGH Complexity : 4+ Standardized tests and measures addressing body structure, function, activity limitation and / or participation in recreation  LOW Complexity : Stable, uncomplicated  Other outcome measures barthel  LOW       Based on the above components, the patient evaluation is determined to be of the following complexity level: LOW     Pain Rating:  controlled    Activity Tolerance:   Good and Fair    After treatment patient left in no apparent distress:   Sitting in chair and Call bell within reach    COMMUNICATION/EDUCATION:   The patients plan of care was discussed with: Registered nurse and Case management. Fall prevention education was provided and the patient/caregiver indicated understanding. and Patient/family have participated as able in goal setting and plan of care.     Thank you for this referral.  Hina Dowell, PT   Time Calculation: 49 mins

## 2021-09-21 NOTE — PROGRESS NOTES
Pt was very anxious over not receiving his Lovenox shot at 2300 because he states he takes it at 11pm every night and he doesn't 'want to die in the hospital\". Nurse assured him doctor did not want him to have the Lovenox shot for one night due to surgery and pt would be starting Coumadin in then morning at 0900. Pt stated he is supposed to get both the Coumadin and Lovenox as a bridge. Nurse contacted doctor on call to clarify. Dr. Promise Luna stated Lovenox would not be ordered tonight and the pt could talk to Dr. Margy Park about it in the morning. Nurse also mentioned to Dr. Promise Luna that pt had received a one time dose of Valium in the PACU and could probably use another dose. Orders were received for another one time dose of Valium if the pt needed it. Pt called out around 0400 very frustrated about his pain and the inability to urinate in the position he was in. Nurse stood pt beside bed for pt to void. Gave pt the Valium and pt was calm for remainder of shift.

## 2021-09-21 NOTE — PERIOP NOTES
TRANSFER - OUT REPORT:    Verbal report given to Brittaney RN(name) on Nesha Rojas  being transferred to 538(unit) for routine post - op       Report consisted of patients Situation, Background, Assessment and   Recommendations(SBAR). Time Pre op antibiotic given:1600  Anesthesia Stop time: 3975  Cronin Present on Transfer to floor:no  Order for Cronin on Chart:no  Discharge Prescriptions with Chart:no    Information from the following report(s) SBAR, OR Summary, Procedure Summary, Intake/Output, MAR, Recent Results, Med Rec Status and Cardiac Rhythm nsr was reviewed with the receiving nurse. Opportunity for questions and clarification was provided. Is the patient on 02? YES       L/Min 3       Other nc    Is the patient on a monitor? NO    Is the nurse transporting with the patient? NO    Surgical Waiting Area notified of patient's transfer from PACU? YES      The following personal items collected during your admission accompanied patient upon transfer:   Dental Appliance: Dental Appliances: None  Vision:    Hearing Aid:    Jewelry:    Clothing: Clothing: Other (comment) (clothing)  Other Valuables:  Other Valuables: Eyeglasses, Other (comment) (blue duffel bag)  Valuables sent to safe:

## 2021-09-21 NOTE — PROGRESS NOTES
OCCUPATIONAL THERAPY EVALUATION/DISCHARGE  Patient: Saumya Callejas (48 y.o. male)  Date: 9/21/2021  Primary Diagnosis: DDD (degenerative disc disease), lumbar [M51.36]  Lumbar stenosis with neurogenic claudication [M48.062]  Procedure(s) (LRB):  MINIMALLY INVASIVE SURGERY TRANSFORAMINAL LUMBAR INTERBODY FUSION WITH ROBOTICS (MAZOR) (O-ARM) (N/A) 1 Day Post-Op   Precautions: Back       ASSESSMENT  Based on the objective data described below, the patient presents with back precautions. Overall, pt demonstrated and verbalized understanding with back precautions when performing LB self-care. Pt used sock aide to don his own support hose. Per pt, his wife has arthritis in B hands and sock aide would be helpful. Pt is independent walking in the room without AD. Pt is able to demonstrate no twisting/bending with sammy hygiene. Feel pt is safe for discharge once medically cleared. No further to OT services needed for discharge. Current Level of Function (ADLs/self-care): Functional Outcome Measure: The patient scored 100/100 on the Barthel outcome measure. Other factors to consider for discharge: has a son with Keshia Duarte, 26yo     PLAN :  Recommend with staff:     Recommendation for discharge: (in order for the patient to meet his/her long term goals)  No skilled occupational therapy/ follow up rehabilitation needs identified at this time. This discharge recommendation:  A follow-up discussion with the attending provider and/or case management is planned    IF patient discharges home will need the following DME: none       SUBJECTIVE:   Patient stated This will work really well. \"(sock aide)    OBJECTIVE DATA SUMMARY:   HISTORY:   Past Medical History:   Diagnosis Date    Arthritis     Chronic pain     Diverticulitis     Factor [facility] Leiden [facility] [address]     Pulmonary embolism ([facility] [address] 2001     Past Surgical History:   Procedure Laterality Date    HX COLOSTOMY  2006    HX GI      COLONOSCOPY  HX GI  2007    COLOSTOMY REVERSED     HX KNEE ARTHROSCOPY Left 2001    X2    HX ORTHOPAEDIC Left 2019    WRIST    HX ROTATOR CUFF REPAIR Left 2006    [place] ABDOMEN SURGERY PROC UNLISTED  2006    PARTIAL COLECTOMY       Prior Level of Function/Environment/Context:   Expanded or extensive additional review of patient history:            EXAMINATION OF PERFORMANCE DEFICITS:  Cognitive/Behavioral Status:  Neurologic State: Alert  Orientation Level: Oriented X4  Cognition: Appropriate decision making        Safety/Judgement: Awareness of environment    Skin:     Edema:     Hearing:       Vision/Perceptual:                                     Range of Motion:    AROM: Within functional limits                         Strength:    Strength: Within functional limits                Coordination:  Coordination: Within functional limits  Fine Motor Skills-Upper: Left Intact; Right Intact    Gross Motor Skills-Upper: Left Intact; Comment    Tone & Sensation:    Tone: Normal                         Balance:  Sitting: Intact  Standing: Intact; Without support    Functional Mobility and Transfers for ADLs:  Bed Mobility:       Transfers:  Sit to Stand: Independent  Stand to Sit: Independent  Bed to Chair: Independent  Bathroom Mobility: Independent  Toilet Transfer : Independent    ADL Assessment:  Feeding: Independent    Oral Facial Hygiene/Grooming: Independent         Upper Body Dressing: Independent    Lower Body Dressing: Modified independent; Additional time; Adaptive equipment    Toileting: Modified independent; Additional time                ADL Intervention and task modifications:         Cognitive Retraining  Safety/Judgement: Awareness of environment    Therapeutic Exercise:     Functional Measure:  Barthel Index:    Bathin  Bladder: 10  Bowels: 10  Groomin  Dressing: 10  Feeding: 10  Mobility: 15  Stairs: 10  Toilet Use: 10  Transfer (Bed to Chair and Back): 15  Total: 100/100        The Barthel ADL Index: Guidelines  1. The index should be used as a record of what a patient does, not as a record of what a patient could do. 2. The main aim is to establish degree of independence from any help, physical or verbal, however minor and for whatever reason. 3. The need for supervision renders the patient not independent. 4. A patient's performance should be established using the best available evidence. Asking the patient, friends/relatives and nurses are the usual sources, but direct observation and common sense are also important. However direct testing is not needed. 5. Usually the patient's performance over the preceding 24-48 hours is important, but occasionally longer periods will be relevant. 6. Middle categories imply that the patient supplies over 50 per cent of the effort. 7. Use of aids to be independent is allowed. Ilsa Tinajero., Barthel, D.W. ([id number]). Functional evaluation: the Barthel Index. [facility] (14)2. Nik Fawad aicha MOISES Calhoun, Irene Burk., Donavon Elaine., [place], [address] [place] [address] (1999). Measuring the change indisability after inpatient rehabilitation; comparison of the responsiveness of the Barthel Index and Functional [place] Measure. Journal of Neurology, Neurosurgery, and Psychiatry, 66(4), 243-985. Artem Matthews, N.J.A, AMELIA Byrnes, & Malena Michaels, M.A. (2004.) Assessment of post-stroke quality of life in cost-effectiveness studies: The usefulness of the Barthel Index and the EuroQoL-5D.  Quality of Life Research, 15, 397-71       Occupational Therapy Evaluation Charge Determination   History Examination Decision-Making   LOW Complexity : Brief history review  LOW Complexity : 1-3 performance deficits relating to physical, cognitive , or psychosocial skils that result in activity limitations and / or participation restrictions  LOW Complexity : No comorbidities that affect functional and no verbal or physical assistance needed to complete eval tasks       Based on the above components, the patient evaluation is determined to be of the following complexity level: LOW   Pain Rating:  No c/o of pain    Activity Tolerance:   Good    After treatment patient left in no apparent distress:    Sitting in chair    COMMUNICATION/EDUCATION:   The patients plan of care was discussed with: Physical therapist.     Thank you for this referral.  Aurea Watson OTR/LUIS  Time Calculation: 24 mins

## 2021-09-21 NOTE — PROGRESS NOTES
Problem: Mobility Impaired (Adult and Pediatric)  Goal: *Acute Goals and Plan of Care (Insert Text)  Description: FUNCTIONAL STATUS PRIOR TO ADMISSION: Patient was independent and active without use of DME.    HOME SUPPORT PRIOR TO ADMISSION: The patient lived with family but did not require assist.    Physical Therapy Goals  Initiated 9/21/2021    1. Patient will move from supine to sit and sit to supine , scoot up and down, and roll side to side in bed with modified independence within 4 days. 2. Patient will perform sit to stand with modified independence within 4 days. 3. Patient will ambulate with independence for 300 feet with the least restrictive device within 4 days. 4. Patient will ascend/descend 4 stairs with 1 handrail(s) with supervision/set-up within 4 days. 5. Patient will verbalize and demonstrate understanding of spinal precautions (No bending, lifting greater than 5 lbs, or twisting; log-roll technique; frequent repositioning as instructed) within 4 days. 9/21/2021 1524 by Lissette Pham, PT  Outcome: Progressing Towards Goal  9/21/2021 0957 by Lissette Pham, PT  Outcome: Progressing Towards Goal     PHYSICAL THERAPY TREATMENT  Patient: Sergio Evans (79 y.o. male)  Date: 9/21/2021  Diagnosis: DDD (degenerative disc disease), lumbar [M51.36]  Lumbar stenosis with neurogenic claudication [M48.062] <principal problem not specified>  Procedure(s) (LRB):  MINIMALLY INVASIVE SURGERY TRANSFORAMINAL LUMBAR INTERBODY FUSION WITH ROBOTICS (MAZOR) (O-ARM) (N/A) 1 Day Post-Op  Precautions:    Chart, physical therapy assessment, plan of care and goals were reviewed. ASSESSMENT  Patient continues with skilled PT services and is progressing towards goals. Pt tolerates gait though at a slower pace, states \"maybe I did overdo it this morning. \" Pt performed 300ft without AD, able to perform 5 steps with rails and increased cues.  Performed education to patient and family regarding spine protection, precautions, task modification and increasing functional mobility. Answered questions as able. Will continue to follow, cleared for DC home with HHPT. Current Level of Function Impacting Discharge (mobility/balance): supervision    Other factors to consider for discharge:          PLAN :  Patient continues to benefit from skilled intervention to address the above impairments. Continue treatment per established plan of care. to address goals. Recommendation for discharge: (in order for the patient to meet his/her long term goals)  Physical therapy at least 2 days/week in the home     This discharge recommendation:  Has been made in collaboration with the attending provider and/or case management    IF patient discharges home will need the following DME: to be determined (TBD)       SUBJECTIVE:   Patient stated I am hurting pretty bad now.     OBJECTIVE DATA SUMMARY:   Critical Behavior:  Neurologic State: Alert  Orientation Level: Oriented X4  Cognition: Appropriate decision making  Safety/Judgement: Awareness of environment  Functional Mobility Training:  Bed Mobility:  Rolling: Minimum assistance  Supine to Sit: Minimum assistance     Scooting: Supervision        Transfers:  Sit to Stand: Independent  Stand to Sit: Independent  Stand Pivot Transfers: Independent     Bed to Chair: Independent                    Balance:  Sitting: Intact  Standing: Intact  Ambulation/Gait Training:  Distance (ft): 300 Feet (ft)  Assistive Device: Brace/Splint  Ambulation - Level of Assistance: Independent     Gait Description (WDL): Exceptions to WDL  Gait Abnormalities: Shuffling gait        Base of Support: Widened     Speed/Michelle: Shuffled  Step Length: Left shortened;Right shortened                 Stairs:  Number of Stairs Trained: 5  Stairs - Level of Assistance: Supervision   Rail Use: Both   Pain Rating:  controlled    Activity Tolerance:   Fair and requires frequent rest breaks    After treatment patient left in no apparent distress:   Sitting in chair, Call bell within reach and Caregiver / family present    COMMUNICATION/COLLABORATION:   The patients plan of care was discussed with: Registered nurse and Case management.      Tamela Dowell, PT   Time Calculation: 35 mins

## 2021-09-21 NOTE — PROGRESS NOTES
ELAINE:    RUR 8%    Disposition: Home with HH. [facility] accepted for [facility].     Transportation: Family    Follow up: PCP/Specialist    Zenda Kocher, RN/CRM  [phone]

## 2021-09-21 NOTE — PROGRESS NOTES
Orthopedic Spine Progress Note  Post Op day: 1 Day Post-Op    2021 8:02 AM   Admit Date: 2021  Procedure: Procedure(s): MINIMALLY INVASIVE SURGERY TRANSFORAMINAL LUMBAR INTERBODY FUSION WITH ROBOTICS [facility]) (O-ARM)    Subjective:     Dante Martel has no complaints. Pain well managed. Tolerating diet. No N/V. Pain Control:   Pain Assessment  Pain Scale 1: Numeric (0 - 10)  Pain Intensity 1: 3  Pain Location 1: Back, Buttocks  Pain Orientation 1: Left, Right  Pain Description 1: Aching, Constant    Objective:          Physical Exam:  General:  Alert and oriented. No acute distress. Heart:  Respirations unlabored. Abdomen:   Extremities: Soft, non-tender. No evidence of cyanosis. Pulses palpable in both upper and lower extremities. Neurologic:  Musculoskeletal:  No new motor deficits. Neurovascular exam within normal limits. Sensation stable. Motor: unchanged C5-T1 and L2-S1. Luis's sign negative in bilateral lower extremities. Calves soft, nontender upon palpation and with passive twitch. Moves both upper and lower extremities. Incision: clean, dry, and intact. No significant erythema or swelling. No active drainage noted. Vital Signs:    Blood pressure 107/68, pulse 71, temperature 97.9 °F (36.6 °C), resp. rate 16, height 5' 9\" (1.753 m), weight 94.2 kg (207 lb 10.8 oz), SpO2 96 %. Temp (24hrs), Av.5 °F (36.4 °C), Min:96.5 °F (35.8 °C), Max:97.9 °F (36.6 °C)      LAB:    Recent Labs     21  0319   HGB 14.5     Lab Results   Component Value Date/Time    Sodium 137 2021 03:19 AM    Potassium 5.1 2021 03:19 AM    Chloride 109 (H) 2021 03:19 AM    CO2 23 2021 03:19 AM    Glucose 126 (H) 2021 03:19 AM    BUN 21 (H) 2021 03:19 AM    Creatinine 1.35 (H) 2021 03:19 AM    Calcium 8.1 (L) 2021 03:19 AM       Intake/Output:No intake/output data recorded.   1901 -  0700  In: 1100 [P.O.:100; I.V.:1000]  Out: 0     PT/OT:   Gait:                    Assessment:   Patient is 1 Day Post-Op s/p Procedure(s): MINIMALLY INVASIVE SURGERY TRANSFORAMINAL LUMBAR INTERBODY FUSION WITH ROBOTICS (MAZOR) (O-ARM)    Plan:     1. Continue PT/OT  2. Continue established methods of pain control  3. VTE Prophylaxes - TEDS &/or SCDs   4.  Discharge pending--today vs tomorrow  5.  6.       Signed By: SHADE Parry

## 2021-09-21 NOTE — PROGRESS NOTES
Problem: Falls - Risk of  Goal: *Absence of Falls  Description: Document Sammy Ramsayo Fall Risk and appropriate interventions in the flowsheet.   Outcome: Progressing Towards Goal  Note: Fall Risk Interventions:            Medication Interventions: Teach patient to arise slowly         History of Falls Interventions: Room close to nurse's station

## 2021-09-22 ENCOUNTER — HOME CARE VISIT (OUTPATIENT)
Dept: SCHEDULING | Facility: HOME HEALTH | Age: 61
End: 2021-09-22
Payer: COMMERCIAL

## 2021-09-22 ENCOUNTER — HOME CARE VISIT (OUTPATIENT)
Dept: HOME HEALTH SERVICES | Facility: HOME HEALTH | Age: 61
End: 2021-09-22
Payer: COMMERCIAL

## 2021-09-22 VITALS
SYSTOLIC BLOOD PRESSURE: 130 MMHG | OXYGEN SATURATION: 98 % | WEIGHT: 207.67 LBS | RESPIRATION RATE: 17 BRPM | BODY MASS INDEX: 30.76 KG/M2 | HEIGHT: 69 IN | TEMPERATURE: 98.5 F | DIASTOLIC BLOOD PRESSURE: 80 MMHG | HEART RATE: 76 BPM

## 2021-09-22 VITALS
SYSTOLIC BLOOD PRESSURE: 112 MMHG | DIASTOLIC BLOOD PRESSURE: 60 MMHG | HEART RATE: 69 BPM | TEMPERATURE: 98.1 F | OXYGEN SATURATION: 98 % | RESPIRATION RATE: 18 BRPM

## 2021-09-22 PROCEDURE — 400013 HH SOC

## 2021-09-22 PROCEDURE — G0152 HHCP-SERV OF OT,EA 15 MIN: HCPCS

## 2021-09-22 PROCEDURE — G0151 HHCP-SERV OF PT,EA 15 MIN: HCPCS

## 2021-09-23 ENCOUNTER — HOME CARE VISIT (OUTPATIENT)
Dept: HOME HEALTH SERVICES | Facility: HOME HEALTH | Age: 61
End: 2021-09-23
Payer: COMMERCIAL

## 2021-09-23 ENCOUNTER — HOME CARE VISIT (OUTPATIENT)
Dept: SCHEDULING | Facility: HOME HEALTH | Age: 61
End: 2021-09-23
Payer: COMMERCIAL

## 2021-09-23 VITALS
DIASTOLIC BLOOD PRESSURE: 60 MMHG | HEART RATE: 68 BPM | RESPIRATION RATE: 18 BRPM | TEMPERATURE: 97.8 F | SYSTOLIC BLOOD PRESSURE: 132 MMHG | OXYGEN SATURATION: 98 %

## 2021-09-23 PROCEDURE — G0152 HHCP-SERV OF OT,EA 15 MIN: HCPCS

## 2021-09-27 ENCOUNTER — HOME CARE VISIT (OUTPATIENT)
Dept: SCHEDULING | Facility: HOME HEALTH | Age: 61
End: 2021-09-27
Payer: COMMERCIAL

## 2021-09-27 VITALS
TEMPERATURE: 98.1 F | HEART RATE: 76 BPM | SYSTOLIC BLOOD PRESSURE: 114 MMHG | RESPIRATION RATE: 16 BRPM | OXYGEN SATURATION: 96 % | DIASTOLIC BLOOD PRESSURE: 72 MMHG

## 2021-09-27 PROCEDURE — G0157 HHC PT ASSISTANT EA 15: HCPCS

## 2021-09-28 ENCOUNTER — HOME CARE VISIT (OUTPATIENT)
Dept: SCHEDULING | Facility: HOME HEALTH | Age: 61
End: 2021-09-28
Payer: COMMERCIAL

## 2021-09-28 PROCEDURE — G0152 HHCP-SERV OF OT,EA 15 MIN: HCPCS

## 2021-09-28 NOTE — DISCHARGE SUMMARY
Spine Discharge Summary    Patient ID:  Nesha Rojas  [id number]  male  61 y.o.  1960    Admit date: 9/20/2021    Discharge date: 9/21/21    Admitting Physician: Sunday Olmos MD     Consulting Physician(s):   Treatment Team: Utilization Review: Lyly Pat RN; Care Manager: Demetrius Camarillo RN    Date of Surgery:   9/20/2021     Preoperative Diagnosis:  STENOSIS; DEGENERATIVE DISC DISEASE    Postoperative Diagnosis:   STENOSIS; DEGENERATIVE DISC DISEASE    Procedure(s): MINIMALLY INVASIVE SURGERY TRANSFORAMINAL LUMBAR INTERBODY FUSION WITH ROBOTICS (MAZOR) (O-ARM)     Anesthesia Type:   General     Surgeon: Gavin Ang MD                            HPI:  Pt is a 61 y.o. male who has a history of STENOSIS; [address]  with pain and limitations of activities of daily living who presents at this time for a MINIMALLY INVASIVE SURGERY TRANSFORAMINAL LUMBAR INTERBODY FUSION WITH ROBOTICS [facility]) (O-ARM)  following the failure of conservative management. PMH:   Past Medical History:   Diagnosis Date    Arthritis     Chronic pain     Diverticulitis     Factor 5 Leiden mutation, heterozygous ([facility] [address]     Pulmonary embolism ([facility] [address] 2001       Body mass index is 30.67 kg/m². : A BMI > 30 is classified as obesity and > 40 is classified as morbid obesity. Medications upon admission :   Prior to Admission Medications   Prescriptions Last Dose Informant Patient Reported? Taking? HYDROmorphone (DILAUDID) 2 mg tablet 9/20/2021 at Unknown time  Yes No   Sig: Take  by mouth every six (6) hours as needed for Pain. acetaminophen (Tylenol Extra Strength) 500 mg tablet 9/19/2021 at Unknown time  Yes Yes   Sig: Take 2 Tablets by mouth every six (6) hours as needed for Pain. ascorbic acid, vitamin C, (Vitamin C) 500 mg tablet 9/15/2021  Yes No   Sig: Take 1,000 mg by mouth daily.    cholecalciferol (Vitamin D3) (1000 Units /25 mcg) tablet 9/18/2021  Yes No   Sig: Take 1 Capsule by mouth daily.   enoxaparin (LOVENOX) injection 2021 at 2300  Yes No   Si mg by SubCUTAneous route daily. gabapentin (NEURONTIN) 300 mg capsule 2021 at Unknown time  Yes Yes   Sig: Take 300 mg by mouth three (3) times daily. warfarin (COUMADIN) 1 mg tablet 2021  Yes No   Sig: Take 1 mg by mouth daily. warfarin (Coumadin) 5 mg tablet 2021  Yes No   Sig: Take 5 mg by mouth daily. Facility-Administered Medications: None        Allergies: Allergies   Allergen Reactions    Morphine Anaphylaxis        Hospital Course: The patient underwent surgery. Complications:  None; patient tolerated the procedure well. Was taken to the PACU in stable condition and then transferred to the ortho floor. Perioperative Antibiotics:  Ancef     Postoperative Pain Management:  Oxycodone & Tylenol     Postoperative transfusions:    Number of units banked PRBCs =   none     Post Op complications: none    Hemoglobin at discharge:    Lab Results   Component Value Date/Time    HGB 14.5 2021 03:19 AM    INR 1.0 2021 03:19 AM       Dressing was changed on POD # 1. Incision - clean, dry and intact. No significant erythema or swelling. Wound appears to be healing without any evidence of infection. Neurovascular exam found to be within normal limits. Physical Therapy started following surgery and participated in bed mobility, transfers and ambulation. Gait:  Gait  Base of Support: Widened  Speed/Michelle: Shuffled  Step Length: Left shortened, Right shortened  Gait Abnormalities: Shuffling gait  Ambulation - Level of Assistance: Independent  Distance (ft): 300 Feet (ft)  Assistive Device: Brace/Splint  Rail Use: Both  Stairs - Level of Assistance: Supervision  Number of Stairs Trained: 5                   Discharged to: Home.     Condition on Discharge:   good    Discharge instructions:  - Take pain medications as prescribed  - Resume pre hospital diet      - Discharge activity: activity as tolerated  - Ambulate as tolerated  - Lumbar brace when oob  - Avoid bending, lifting and twisting  - Wound Care Keep wound clean and dry. See discharge instruction sheet. -DISCHARGE MEDICATION LIST     Discharge Medication List as of 9/21/2021  4:30 PM      START taking these medications    Details   traMADoL (ULTRAM) 50 mg tablet Take 1 Tablet by mouth every six (6) hours as needed for Pain for up to 14 days. Max Daily Amount: 200 mg. Indications: pain, Normal, Disp-40 Tablet, R-0      naloxone (NARCAN) 4 mg/actuation nasal spray Use 1 spray intranasally, then discard. Repeat with new spray every 2 min as needed for opioid overdose symptoms, alternating nostrils. Indications: decrease in rate & depth of breathing due to opioid drug, Normal, Disp-1 Each, R-0      ondansetron (ZOFRAN ODT) 4 mg disintegrating tablet Take 1 Tablet by mouth every eight (8) hours as needed for Nausea or Vomiting., Normal, Disp-30 Tablet, R-0         CONTINUE these medications which have CHANGED    Details   HYDROmorphone (DILAUDID) 2 mg tablet Take 1 Tablet by mouth every four (4) hours as needed for Pain for up to 14 days. Max Daily Amount: 12 mg. Indications: pain, Normal, Disp-60 Tablet, R-0      enoxaparin (LOVENOX) injection 150 mg by SubCUTAneous route daily for 5 days. Dispense 5 syringes, Normal, Disp-5 Each, R-0         CONTINUE these medications which have NOT CHANGED    Details   !! warfarin (Coumadin) 5 mg tablet Take 5 mg by mouth daily. , Historical Med      !! warfarin (COUMADIN) 1 mg tablet Take 1 mg by mouth daily. , Historical Med      gabapentin (NEURONTIN) 300 mg capsule Take 300 mg by mouth three (3) times daily. , Historical Med      acetaminophen (Tylenol Extra Strength) 500 mg tablet Take  by mouth every six (6) hours as needed for Pain., Historical Med      cholecalciferol (Vitamin D3) (1000 Units /25 mcg) tablet Take  by mouth daily. , Historical Med      ascorbic acid, vitamin C, (Vitamin C) 500 mg tablet Take 1,000 mg by mouth daily. , Historical Med       !! - Potential duplicate medications found. Please discuss with provider.        per medical continuation form      -Follow up in office in 2 weeks      Signed:  SHADE Cade     9/28/2021  8:23 AM

## 2021-09-29 VITALS
HEART RATE: 77 BPM | DIASTOLIC BLOOD PRESSURE: 60 MMHG | OXYGEN SATURATION: 97 % | TEMPERATURE: 97.8 F | RESPIRATION RATE: 17 BRPM | SYSTOLIC BLOOD PRESSURE: 122 MMHG

## 2021-09-30 ENCOUNTER — HOME CARE VISIT (OUTPATIENT)
Dept: SCHEDULING | Facility: HOME HEALTH | Age: 61
End: 2021-09-30
Payer: COMMERCIAL

## 2021-09-30 VITALS
TEMPERATURE: 98.1 F | DIASTOLIC BLOOD PRESSURE: 60 MMHG | RESPIRATION RATE: 18 BRPM | HEART RATE: 70 BPM | SYSTOLIC BLOOD PRESSURE: 122 MMHG | OXYGEN SATURATION: 98 %

## 2021-09-30 PROCEDURE — G0152 HHCP-SERV OF OT,EA 15 MIN: HCPCS

## 2021-09-30 PROCEDURE — G0157 HHC PT ASSISTANT EA 15: HCPCS

## 2021-10-04 VITALS
DIASTOLIC BLOOD PRESSURE: 70 MMHG | RESPIRATION RATE: 16 BRPM | TEMPERATURE: 97.1 F | SYSTOLIC BLOOD PRESSURE: 104 MMHG | OXYGEN SATURATION: 98 % | HEART RATE: 74 BPM

## 2021-10-05 ENCOUNTER — HOME CARE VISIT (OUTPATIENT)
Dept: SCHEDULING | Facility: HOME HEALTH | Age: 61
End: 2021-10-05
Payer: COMMERCIAL

## 2021-10-05 ENCOUNTER — HOME CARE VISIT (OUTPATIENT)
Dept: HOME HEALTH SERVICES | Facility: HOME HEALTH | Age: 61
End: 2021-10-05
Payer: COMMERCIAL

## 2021-10-05 VITALS
DIASTOLIC BLOOD PRESSURE: 62 MMHG | SYSTOLIC BLOOD PRESSURE: 128 MMHG | RESPIRATION RATE: 18 BRPM | HEART RATE: 70 BPM | TEMPERATURE: 98.1 F | OXYGEN SATURATION: 97 %

## 2021-10-05 VITALS
HEART RATE: 77 BPM | TEMPERATURE: 97.8 F | SYSTOLIC BLOOD PRESSURE: 128 MMHG | OXYGEN SATURATION: 97 % | RESPIRATION RATE: 16 BRPM | DIASTOLIC BLOOD PRESSURE: 62 MMHG

## 2021-10-05 PROCEDURE — G0152 HHCP-SERV OF OT,EA 15 MIN: HCPCS

## 2021-10-05 PROCEDURE — G0157 HHC PT ASSISTANT EA 15: HCPCS

## 2021-10-06 NOTE — CASE COMMUNICATION
Patient seen today for PT. Observed surgical incisions with right incision appeared to be wet from damp bandage. Patient had showered earlier in day. Removed dressing and spouse applied dry band aide with non stick edges to allow for air flow. Spouse to remove at night to allow air flow and to monitor and will call MD in am if further deterioration or if area does not dry with damp dressing removed. Patient at MD for follow up yesterday and seen by NP with no problems noted. Patient not comfortable with no dressing as worried that lumbar support will rub incision sites and remove scabs. There is a current picture of incision in CC in the media section. Call to office.

## 2021-10-07 ENCOUNTER — HOME CARE VISIT (OUTPATIENT)
Dept: SCHEDULING | Facility: HOME HEALTH | Age: 61
End: 2021-10-07
Payer: COMMERCIAL

## 2021-10-07 ENCOUNTER — HOME CARE VISIT (OUTPATIENT)
Dept: HOME HEALTH SERVICES | Facility: HOME HEALTH | Age: 61
End: 2021-10-07
Payer: COMMERCIAL

## 2021-10-07 PROCEDURE — G0157 HHC PT ASSISTANT EA 15: HCPCS

## 2021-10-08 NOTE — CASE COMMUNICATION
patient did not answer telephone call on way to planned and scheduled visit, x 3 attempts, performed drive by, patients spouse opened door and patient in bed, resting, patient was sleeping and phone off, patient has significant pain and continued but less than yesterday unable to provide numerical value patient was lethargic and noted intermittent confusion with verbal report of how he was feeling, spouse stated the muscle relaxers and return back to prior dosage for narcotics due to increased pain, patient reports he cant participate in OT services to date, reported he is so tired and will definitely be seen next week but exhausted to date.

## 2021-10-10 VITALS
HEART RATE: 90 BPM | OXYGEN SATURATION: 98 % | RESPIRATION RATE: 16 BRPM | DIASTOLIC BLOOD PRESSURE: 60 MMHG | SYSTOLIC BLOOD PRESSURE: 90 MMHG | TEMPERATURE: 98 F

## 2021-10-11 ENCOUNTER — HOME CARE VISIT (OUTPATIENT)
Dept: HOME HEALTH SERVICES | Facility: HOME HEALTH | Age: 61
End: 2021-10-11
Payer: COMMERCIAL

## 2021-10-11 ENCOUNTER — HOME CARE VISIT (OUTPATIENT)
Dept: SCHEDULING | Facility: HOME HEALTH | Age: 61
End: 2021-10-11
Payer: COMMERCIAL

## 2021-10-11 VITALS
DIASTOLIC BLOOD PRESSURE: 78 MMHG | HEART RATE: 70 BPM | SYSTOLIC BLOOD PRESSURE: 138 MMHG | OXYGEN SATURATION: 98 % | RESPIRATION RATE: 18 BRPM | TEMPERATURE: 98.1 F

## 2021-10-11 PROCEDURE — G0151 HHCP-SERV OF PT,EA 15 MIN: HCPCS

## 2021-10-11 PROCEDURE — G0152 HHCP-SERV OF OT,EA 15 MIN: HCPCS

## 2021-10-12 VITALS
RESPIRATION RATE: 18 BRPM | TEMPERATURE: 98.1 F | OXYGEN SATURATION: 98 % | SYSTOLIC BLOOD PRESSURE: 138 MMHG | HEART RATE: 70 BPM | DIASTOLIC BLOOD PRESSURE: 78 MMHG

## 2021-10-13 ENCOUNTER — HOME CARE VISIT (OUTPATIENT)
Dept: HOME HEALTH SERVICES | Facility: HOME HEALTH | Age: 61
End: 2021-10-13
Payer: COMMERCIAL

## 2021-10-14 ENCOUNTER — HOME CARE VISIT (OUTPATIENT)
Dept: SCHEDULING | Facility: HOME HEALTH | Age: 61
End: 2021-10-14
Payer: COMMERCIAL

## 2021-10-14 PROCEDURE — G0151 HHCP-SERV OF PT,EA 15 MIN: HCPCS

## 2021-10-15 VITALS
SYSTOLIC BLOOD PRESSURE: 128 MMHG | RESPIRATION RATE: 14 BRPM | OXYGEN SATURATION: 98 % | TEMPERATURE: 97.9 F | DIASTOLIC BLOOD PRESSURE: 88 MMHG | HEART RATE: 90 BPM

## 2021-10-19 ENCOUNTER — HOME CARE VISIT (OUTPATIENT)
Dept: SCHEDULING | Facility: HOME HEALTH | Age: 61
End: 2021-10-19
Payer: COMMERCIAL

## 2021-10-19 VITALS
TEMPERATURE: 97.8 F | OXYGEN SATURATION: 96 % | DIASTOLIC BLOOD PRESSURE: 78 MMHG | HEART RATE: 92 BPM | SYSTOLIC BLOOD PRESSURE: 128 MMHG | RESPIRATION RATE: 16 BRPM

## 2021-10-19 PROCEDURE — G0157 HHC PT ASSISTANT EA 15: HCPCS

## 2021-10-21 ENCOUNTER — HOME CARE VISIT (OUTPATIENT)
Dept: SCHEDULING | Facility: HOME HEALTH | Age: 61
End: 2021-10-21
Payer: COMMERCIAL

## 2021-10-21 VITALS
DIASTOLIC BLOOD PRESSURE: 68 MMHG | HEART RATE: 95 BPM | TEMPERATURE: 98.4 F | SYSTOLIC BLOOD PRESSURE: 118 MMHG | OXYGEN SATURATION: 97 % | RESPIRATION RATE: 12 BRPM

## 2021-10-21 PROCEDURE — G0151 HHCP-SERV OF PT,EA 15 MIN: HCPCS

## 2021-11-11 ENCOUNTER — TELEPHONE (OUTPATIENT)
Dept: ORTHOPEDIC SURGERY | Age: 61
End: 2021-11-11

## 2021-11-11 DIAGNOSIS — M48.062 LUMBAR STENOSIS WITH NEUROGENIC CLAUDICATION: ICD-10-CM

## 2021-11-11 DIAGNOSIS — M51.36 DDD (DEGENERATIVE DISC DISEASE), LUMBAR: Primary | ICD-10-CM

## 2021-11-11 RX ORDER — CYCLOBENZAPRINE HCL 10 MG
10 TABLET ORAL
Qty: 30 TABLET | Refills: 0 | Status: SHIPPED | OUTPATIENT
Start: 2021-11-11

## 2021-11-11 NOTE — TELEPHONE ENCOUNTER
Naseem Ryena  Is S/P Lumbar Fusion on 09/20/2021. He is actively taking his Dilaudid 2 mg every 4 to 6 hours as needed for pain. He reports he is having pain that is dull/achy in his lower back around the incision, incision is clean, dry and intact. No S/S of infection noted. Ciaran Tony  Reports the pain is worse in the morning and at night. He was advised to continue his Dilaudid at the 2 mg every 6 hours and begin flexeril 10 mg 3 x a day for his ongoing pain. He is currently on coumadin and the interactions were discussed.  He is on gabapentin at night

## 2021-11-12 DIAGNOSIS — M51.36 DDD (DEGENERATIVE DISC DISEASE), LUMBAR: Primary | ICD-10-CM

## 2021-11-15 RX ORDER — HYDROMORPHONE HYDROCHLORIDE 2 MG/1
2 TABLET ORAL
Qty: 42 TABLET | Refills: 0 | Status: SHIPPED | OUTPATIENT
Start: 2021-11-15 | End: 2021-11-18

## 2021-11-29 DIAGNOSIS — M48.062 LUMBAR STENOSIS WITH NEUROGENIC CLAUDICATION: ICD-10-CM

## 2021-11-29 DIAGNOSIS — M51.36 DDD (DEGENERATIVE DISC DISEASE), LUMBAR: ICD-10-CM

## 2021-11-29 RX ORDER — CYCLOBENZAPRINE HCL 10 MG
10 TABLET ORAL
Qty: 30 TABLET | Refills: 0 | Status: SHIPPED | OUTPATIENT
Start: 2021-11-29

## 2021-11-30 RX ORDER — HYDROMORPHONE HYDROCHLORIDE 2 MG/1
2 TABLET ORAL
Qty: 42 TABLET | Refills: 0 | Status: SHIPPED | OUTPATIENT
Start: 2021-11-30 | End: 2021-12-02 | Stop reason: ALTCHOICE

## 2021-12-02 RX ORDER — TRAMADOL HYDROCHLORIDE 50 MG/1
50 TABLET ORAL
Qty: 50 TABLET | Refills: 0 | Status: SHIPPED | OUTPATIENT
Start: 2021-12-02 | End: 2021-12-05

## 2021-12-02 NOTE — TELEPHONE ENCOUNTER
Patient called asking medications to be changed, he wants to take Tramadol instead of  Dilaudid . Please review. Sx 9/20/21 next appt 12/16/21

## 2021-12-16 ENCOUNTER — OFFICE VISIT (OUTPATIENT)
Dept: ORTHOPEDIC SURGERY | Age: 61
End: 2021-12-16
Payer: COMMERCIAL

## 2021-12-16 VITALS — WEIGHT: 207 LBS | BODY MASS INDEX: 30.55 KG/M2

## 2021-12-16 DIAGNOSIS — Z98.1 S/P LUMBAR FUSION: Primary | ICD-10-CM

## 2021-12-16 PROCEDURE — 99024 POSTOP FOLLOW-UP VISIT: CPT | Performed by: ORTHOPAEDIC SURGERY

## 2021-12-16 RX ORDER — TRAMADOL HYDROCHLORIDE 50 MG/1
50 TABLET ORAL
Qty: 40 TABLET | Refills: 0 | Status: SHIPPED | OUTPATIENT
Start: 2021-12-16 | End: 2021-12-19

## 2021-12-16 RX ORDER — GABAPENTIN 300 MG/1
300 CAPSULE ORAL 3 TIMES DAILY
Qty: 90 CAPSULE | Refills: 0 | Status: SHIPPED | OUTPATIENT
Start: 2021-12-16

## 2021-12-16 NOTE — PATIENT INSTRUCTIONS
IMPORTANT - Please note  Not all therapists are covered by your insurance plan. Once you have selected a facility, please make sure it is in network. If you have selected TOA for physical therapy, their department will handle any pre-cert needs.  PATIENTS: You must call  for physical therapy appointments. Should you need any need any further assistance, please call [phone]. 35 Lists of hospitals in the [place]) [phone] ext. 701 [facility], [address]  [place]) [phone] ext. 461 [facility]) [phone] ext. Lopezside  320 [facility], [address]  [place] ext. [address] Medicine  [address] [id number]  [address] [address], [address] 529635    [address]  325 E Hospitals in [phone] F) [address]  Nnbyxgznjm72-89-02-56  [facility] [place]: [address] W. 57 [facility], Suite P  [facility] [phone] F) [phone]    [address]  [address]) 211 [facility] Road) [phone]  7650 E. 30 [facility]) [phone] F) [phone]    [facility]  Tawny Matters (little leaguer's elbow)  C/ Ann De Los V[place] 30) [phone] F) [phone]    MVP Therapy  [place]  [phone] F) Basil Crawford 1471 Services  [place]: 3 [place] Rusty June, [address]  [place] F) [phone]  [place]: [facility]4 [address] [phone] F) 1815 [address], 8614 [facility]  F) Voorimehe 72 Tsosie Sprou FavorW. D. Partlow Developmental CentertraMount Auburn Hospital 36, 375 [facility], [facility] Avenue) [address] [phone]    [facility]  [facility] [place]: [address]  [address], 224 McConnell Waabqytf3707 475 13 [phone] F) 500 N[address]  [address]Th Qxwqngt710 75 772  [place]: 100 [facility] [place]  P) 811 [facility] Avenue) [address]: 4624 [facility]  P) [phone] F) [phone]  [place]: 3100 W. [facility]) [phone] F) [phone]   PT Works  39162 [facility] [phone] F) [address] Therapy  [facility]. P) [phone] F) [address] Hospital [address]) [facility]) [address]  [address]  P) [address]) [address]  8338 [address] Street) [address]) 1055 [facility] Isaías  P) [phone] F) [address]  1401 [address]) 88 [facility] Drive) [address]  725 [facility] Rd, 1701 [facility]) Darin Haney [phone] F) [phone]    Tani Dural Side  [place] PT  Glendia Primmer (Chela-Samantha)  203 N.  [address]) [address], 9352 [facility]  F) 2801 [facility] Drive  446 [facility]) Ivaniaaga 6957) 220 Francisco [facility]    Advanced Physical Therapy  112 [facility] [phone] [address] [place] [address] [address]  1798 [facility], [address]  [phone]    [address]  Aqqusinersuaq 171, [address]  Manisha) [address]) [phone]    [facility] PT  67 [facility] [phone] F) [address]  [address], [address]  Baksarojgq704 613 434 F) [phone]    [place] 55  [facility]: 101 E [facility], [address]  [place] F) [phone]  Davian: [address], [address]  [place] F) [phone]    MVP Therapy  47736 Jasbir Nicholson [phone] F) [phone] [facility]  [place]: [address] [phone] F) [phone]  [place]: 1538 [facility]) [phone] F) [phone]  [place]: 81 Chalkokondlotus Zapataria) 2000 [facility] [address]) [address]: [address] [place] [address]) 11 [address], [facility] A  P) [phone]    Select PT  1500 [facility]) [phone] F) [address]  P) 1316 [phone]: 320 [facility], [address]  P) [address] 200 [facility] [address] [address]) [phone]    [facility] Rehab  850 [facility] [phone]      [facility] 2001 Doctors   [address], [address] [place] [address]) [phone] F) 2000 [facility]) [phone] F) [phone]    [facility]  407 E. [address]) [phone] F) [address]  Manisha) [phone] F) 1 [facility], [address] [place] [address]) [phone] F) [phone]    Physical Therapy [address], 2304 [facility] [phone] F) [phone]    [facility]  [place]: 0192 S. [facility]  P) Angella) [phone]  [place]: 3130 Right 8105 [facility], 2710 [place] [address]) 83483 [facility]) [phone] F) [address]: 65 S. ADVOCATE [facility]) [phone]  [place]: 2309 Loop [facility]) [phone]         IMPORTANT - Please note  Not all therapists are covered by your insurance plan. Once you have selected a facility, please make sure it is in network. If you have selected TO for physical therapy, their department will handle any pre-cert needs.  PATIENTS: You must call [facility] [place] for physical therapy appointments. Should you need any need any further assistance, please call [phone]. Vani Rodriguez Physical Therapy  1301 [facility]  P) [phone] F) [phone]    [address] [place] [address], 730 [facility]) [phone] F) [phone]    [facility] PT  +1901 S. [address] [place] [address], [address]  P) [phone] F) [phone]    Professional Therapies  1997 S.  [address] [place] [address], [address] [place] [address]) [phone] f) - 2425 [facility], [address] B  P) (577) 3300 [facility]) [phone]  [facility]: 2100 [facility] [address], [address]  P) [phone] F) [phone]    [facility] Physical Therapy  [address]  P) [phone] F) [phone]    Progressive Therapy  104 [facility]  P) [phone] F) [phone]    [address] Physical Therapy  Condomínio Nossa Carlosra De Razia 2310) [phone]    [facility] for PT  2[address], [address]  [place] [address] [phone]) [phone]      [facility]  181 Rowena Elena,6Th Floor, [address]) [phone] F) [phone]    [facility]  [address] [place] [address], 2 R[address] De [facility]) 071 5590) 04.27.17.75.15, 708 [facility]) [phone] F) [phone]    Navin Trinh [place] 79 Rehab  [address]) [phone] F) [phone] 5974 [facility] Road Sq.: 200 [facility] [place], 67 [facility]) [phone]) 57 [phone] 22: Taz De Margarita 40, [address]  P)  [phone]  [facility] [place] Crossin Joann) [phone] F) [phone]    Pivot PT  [place]: 927 N. [employer], [address]  P) [phone] F) [phone]  [place]: Angela 380 Basil Mouco 20, 708 [facility]) [phone] F) [phone]   [place]: 700 [address], 2304 [facility] [address]) [phone] F) [phone]    Dickson Maria 50) [phone] F) [phone]    [address] [place] [address]  650 [facility] [address], [address],8Th Floor 1  P) [phone] F) [phone] [facility] 346  805 [facility], [address] 1  P) [phone] F) [phone] PT  320-B [facility] Hospitalt.  Energy East [facility]) [phone] F) [phone]  [address], [address]  P) [phone]) [phone]) [phone]    [address]) [phone]    Professional Therapies  [address], [address]-M) [phone]    [facility]ies  [address]) [phone]    [address]: [facility]) [phone] F) [phone]  East: [address] [place] [address] (59 [employer] [facility] [address])  P) [phone] 991 50 51: 3160 [facility] ([address])  Toy 77) [phone] F) [phone]  Progressive Therapy  520 [facility]) [phone] F) [phone]    [address], 67 [facility]) [phone] F) [phone]    [facility]  Progressive Therapy  1412 W.  [facility]) [phone] F) [phone]    Carry [place] Sandhya 77, 708 [facility]) [phone]    [address], 708 [facility]) [phone]    [facility] 88, 3015 [facility]) [phone]     130 W [facility] [address]  3528 [facility]  P) [phone] F) [phone]    [facility], [address]-M)  [phone] 2727 [facility]) 03.41.34.63.79) [phone]    [address]  1300 N [facility] St) [phone]    [facility] [address])  [phone]  [address] [place] [address]  Esmejohn Aliyah FLOYD Christina 106  [facility]) [phone]   [address] 160 1200 [facility]) [phone] F) [phone] Physical Therapy  5[address] [facility] [address]) [phone] Physical Therapy  [place]: 701 [facility]) [phone]  [place]: 5401 [facility]) [phone] F) [phone]    [address] Explan[address] 69: [facility] CHEMICAL DEPENDENCY [facility]  P) [phone] F) [phone]  Durand Prost: R Coutada 508) [phone]      [address], [address], 3600 [facility] Hospital[facility]) [phone]    Spectrum Physical Therapy  [facility] [address], [address]  P) [phone]    [address] BRIGHT at [facility]/G[facility]  401 [facility]) [phone]    [facility]ivot Physical Therapy  [address]: [address]  Manisha) [phone] F) [phone]: 101 [facility], Suite B  [place]) [phone]  2480 [address],5Th Floor  [place] [address] Physical Therapy  701 [facility], [address]  P [phone] F) [phone]      [facility] [address] Emy LUCERO [address] 170) [phone]    [address]  2700 [facility] Ave, Suite C  [place]) [phone] F) [phone]  1795 [facility] 64 [facility], [address] 1  P) ([phone] F) [phone]  [place]: [facility]3 [facility], [address]  P) [phone] F) [phone] Physical Therapy  171 EElizabeth Gracia D 87) 3903 [phone]    [facility] [place] [address], [address] [place] [address],# [address]) [phone] F) [phone] 402 Francisco June, [address]  [place]) [phone]    [address]) [phone]    Jaciel 61) [phone] F) (411) [address]  4058 [facility]) ([phone] F) [phone]           IMPORTANT - Please note  Not all therapists are covered by your insurance plan. Once you have selected a facility, please make sure it is in network. If you have selected TOA for physical therapy, their department will handle any pre-cert needs.  PATIENTS: You must call  for physical therapy appointments. Should you need any need any further assistance, please call 1900  Street Physical Therapy  216 [facility]) [phone] F) [phone]      [facility]  Nito Vasquez 70., 301 [address] June) [phone] F) [phone]    [facility] for [facility] Therapies  812 E. 12389 [facility]) [phone]    [facility] 320 350 [facility], 301 [facility] 83,8Th Floor 2  P) [phone] F) [phone]    [address]5 [address]) [phone] In 300 South Dylan [place]: 320 [facility],Third Floor, 802 [facility]) [phone]  [facility]: [address] [place] [address]) [phone]  F) [phone] PT  [place]: Keegan 87) [phone] F) [phone]: 250 [address]) [phone] F) [phone]: 751 NATALIA Soliman) [phone]  [address]son: 57 [facility]) [phone] : [address], [address] 8685 76 283 [phone]: 2170 [facility]) [phone] F) [phone]        [facility]) [phone] In R Karon Day 106: 703 N [facility] [address] [place] [address] 83,8Th Floor 300  P) [phone] F) [phone]  [place]: 0255 15 [facility], 550 [facility] Avenue) [phone] F) [phone]: [place], [address]  P) [phone] F) [phone]  Pivot PT  [place]: 250 W.  First Class EV Conversions, [address]  P) [phone] F) [phone]  [facility]: 300 [facility], [address]  P) [phone]    [address] 88  P) [phone]      2712 [address]) [phone] F) [phone]      [address], [address]  [place]) [phone]  125 [facility]) ([address]: 340 [facility], [address]  P) [phone]  F) [phone]: 420 N Fili ) 24-60-61-99 12681 [phone] 84 57: [address], [address]) 74 [phone] 112 Physical Therapy  [address], [address] 83,8Th Floor 2  P) [phone] F) [phone]      [facility] 1524 4144 [place] Ot[place] One, [address],8Th Floor E  [place]) [phone] F) [phone]  Back to [address] [place] [address] [address]  P) [phone] Physical Therapy  [address]  [place]) [phone]    Lora Salgado: 76681 N. [facility]) [phone]   [facility]: Basil Mcfarlandno Neftali 1841 [phone] : Arcadio 80 [phone]) [phone] F) [phone]     Professional Therapies  [facility], [address] [facility] [address]) [phone] First Class EV Conversions, 708 [facility])  [phone]    232 [facility] Road: 176 [facility], 1915 [facility] [address]) [phone] F) [phone]  [facility]: 80 Brittany   P)  [phone] 819 [place] 494415 [facility],5Th Floor, 708 South [facility] Street) [phone]) [phone]      [address]   [address]) 03.41.34.63.79 [phone]    [facility] Therapies   82 Andrew Taqueria) [phone]  2119 Jamee 70) [phone]  F) [phone]      [facility] In [address] PT  74860 Loree Kussmaul, [address],8Th Floor 15  P) [phone]  F) [phone]  [facility] Therapy  216 [facility] Road) [phone] F) [phone]      WK [facility] Pediatric Therapies  10 42 [facility]) [phone] F) [phone]    [facility]  9352 [facility] [place]  P) [phone]      Ludwig  In Touch Therapy  1187 N.  [employer]ation  P) [phone] F) [phone]    [facility]  [facility] 53  P) [phone] 3535 [facility] Rd  94 [facility] Road  P) [phone]  129 [facility] 1002 [address], [address])        [address] West [facility] [address] 1002 [address], [address]  P) [phone] F) [phone]  Performance PT of [place] [address], [address]  [place]asty) [phone] Physical Therapy  [address] 43) [phone] F) [phone] In [facility] 32: 117 [facility]  P) [phone] F) [phone]  [facility]: ely[facility]) [phone] Physical Therapy  [address] [place] [address]  P) [phone]    [address] 274  1000 [facility]  P) [phone] option  F) [phone]      [facility] Physical Therapy  Cassidy 59   Manisha) [phone] In 3358 [facility] Avenue: [address] [place] [address] Drive)  [phone] F) [phone]) [phone] Rayo [place]: 6800 [facility], [address]  P) ([phone] F) [phone]  [place]: Zoran Fortune Plass 75, [address]  [place] [phone] F) [phone]  [address]  P) [phone]  Pivot Physical Therapy  First [place]: Shirley 69, [address]  P) [phone] : [place] 4575, [address]  P) [phone] F) [phone] 4755 [facility] [address] for Kids  Osmajoentie 86, R Patrão Caramelho 46) [phone] F) [phone]  Professional Therapies  Kavehatan 7) [phone] F) [phone]  [address] [place] [address]) [phone]) [phone] F) [phone] PT  [address]  P) [phone] F) [phone]    [address]  [address] Wolf, [address]  P) [phone] F) [phone]    25460 [facility]  1705 [address], [address]   [place]) [phone]) [phone]  Pivot Physical Therapy  [place]: [address], [address]) [phone]  [place]: 8383 N Michael [facility], [address]  P) [phone] F) [phone]: [address]  [place]) [phone] F) [phone] [address] [place] [address] In Motion PT  [address]) [phone]) [phone]

## 2021-12-16 NOTE — PROGRESS NOTES
Sumit Cordova (: 1960) is a 64 y.o. male patient here for evaluation of the following chief complaint(s):  Surgical Follow-up (Sx 2021 L5/S1 Fusion (PO#2)) and Back Pain         ASSESSMENT/PLAN:  Below is the assessment and plan developed based on review of pertinent history, physical exam, labs, studies, and medications. 1. S/P lumbar fusion  -     XR SPINE LUMB 2 OR 3 V; Future  -     gabapentin (NEURONTIN) 300 mg capsule; Take 1 Capsule by mouth three (3) times daily. Max Daily Amount: 900 mg., Normal, Disp-90 Capsule, R-0  -     traMADoL (ULTRAM) 50 mg tablet; Take 1 Tablet by mouth every six (6) hours as needed for Pain for up to 3 days. Max Daily Amount: 200 mg., Normal, Disp-40 Tablet, R-0  -     REFERRAL TO PHYSICAL THERAPY; Future      The patient's radiologic findings have been reviewed with him in detail today. He is doing fairly well at this point in his postoperative recovery. He has no residual leg symptoms, but does have continued and expected low back pain. We have discussed various treatment options, and I would like for him to wean from his QuickDraw brace, and begin to increase his bending, lifting, twisting. He will start with outpatient physical therapy. He has been given instruction on how to wean from his gabapentin and also his tramadol. I have encouraged him to take Tylenol regularly and effort of further relieving his pain. We will see him back for a routine postoperative follow-up visit in 6 to 8 weeks. Return in about 6 weeks (around 2022). SUBJECTIVE/OBJECTIVE:  Sumit Cordova (: 1960) is a 64 y.o. male who presents today for the following:  Chief Complaint   Patient presents with    Surgical Follow-up     Sx 2021 L5/S1 Fusion (PO#2)    Back Pain        HPI   Mr. Nora Sandoval returns today for a routine postoperative follow-up visit. He is approximately 10 weeks out from his recent posterior lumbar fusion.   He has no residual leg symptoms, but does have continued low back pain. He states that he is taking gabapentin 3 times per day as well as continues taking tramadol 3-4 times per day. He is wearing his QuickDraw brace as prescribed. IMAGING:  XR Results (most recent):  Results from Appointment encounter on 12/16/21    XR SPINE LUMB 2 OR 3 V    Narrative  AP and lateral images today demonstrate no acute abnormalities. There is a stable L5-S1 interbody fusion. MRI Results (most recent):  No results found for this or any previous visit. Allergies   Allergen Reactions    Morphine Anaphylaxis       Current Outpatient Medications   Medication Sig    gabapentin (NEURONTIN) 300 mg capsule Take 1 Capsule by mouth three (3) times daily. Max Daily Amount: 900 mg.  traMADoL (ULTRAM) 50 mg tablet Take 1 Tablet by mouth every six (6) hours as needed for Pain for up to 3 days. Max Daily Amount: 200 mg.  cyclobenzaprine (FLEXERIL) 10 mg tablet Take 1 Tablet by mouth three (3) times daily as needed for Muscle Spasm(s).  cyclobenzaprine (FLEXERIL) 10 mg tablet Take 1 Tablet by mouth three (3) times daily as needed for Muscle Spasm(s). (Patient taking differently: Take 10 mg by mouth three (3) times daily as needed for Muscle Spasm(s). Indications: muscle spasm)    cephalexin (KEFLEX) 750 mg capsule Take 750 mg by mouth four (4) times daily.  naloxone (NARCAN) 4 mg/actuation nasal spray Use 1 spray intranasally, then discard. Repeat with new spray every 2 min as needed for opioid overdose symptoms, alternating nostrils. Indications: decrease in rate & depth of breathing due to opioid drug    ondansetron (ZOFRAN ODT) 4 mg disintegrating tablet Take 1 Tablet by mouth every eight (8) hours as needed for Nausea or Vomiting.  warfarin (Coumadin) 5 mg tablet Take 5 mg by mouth daily.  warfarin (COUMADIN) 1 mg tablet Take 1 mg by mouth daily.     acetaminophen (Tylenol Extra Strength) 500 mg tablet Take 2 Tablets by mouth every six (6) hours as needed for Pain.  cholecalciferol (Vitamin D3) (1000 Units /25 mcg) tablet Take 1 Capsule by mouth daily.  ascorbic acid, vitamin C, (Vitamin C) 500 mg tablet Take 1,000 mg by mouth daily. No current facility-administered medications for this visit. Past Medical History:   Diagnosis Date    Arthritis     Chronic pain     Diverticulitis     Factor 5 Leiden mutation, heterozygous ([address]     Pulmonary embolism ([facility] [address]         Past Surgical History:   Procedure Laterality Date    HX COLOSTOMY  2006    HX GI      COLONOSCOPY    HX GI  2007    COLOSTOMY REVERSED     HX KNEE ARTHROSCOPY Left 2001    X2    HX ORTHOPAEDIC Left 2019    WRIST    HX ROTATOR CUFF REPAIR Left     SD ABDOMEN SURGERY PROC UNLISTED  2006    PARTIAL COLECTOMY       Family History   Problem Relation Age of Onset    Cancer Mother         BREAST    No Known Problems Son     No Known Problems Daughter     Anesth Problems Neg Hx         Social History     Tobacco Use    Smoking status: Former Smoker     Packs/day: 1.00     Years: 8.00     Pack years: 8.00     Quit date:      Years since quittin.9    Smokeless tobacco: Never Used   Substance Use Topics    Alcohol use: Yes     Alcohol/week: 1.0 standard drink     Types: 1 Shots of liquor per week        Review of Systems   Constitutional: Negative. Respiratory: Negative. Cardiovascular: Negative. Gastrointestinal: Negative. Endocrine: Negative. Genitourinary: Negative. Skin: Negative. Allergic/Immunologic: Negative. Hematological: Negative. Psychiatric/Behavioral: Negative. All other systems reviewed and are negative. No flowsheet data found. Vitals: Wt 207 lb (93.9 kg)   BMI 30.55 kg/m²    Body mass index is 30.55 kg/m². Physical Exam    Integumentary  Assessment of Surgical Incision - healing and consistent with normal anticipated wound healing.     Neurologic  Sensory  Light Touch - Intact - Globally. Overall Assessment of Muscle Strength and Tone reveals  Lower Extremities - Right Iliopsoas - 5/5. Left Iliopsoas - 5/5. Right Tibialis Anterior - 5/5. Left Tibialis Anterior - 5/5. Right Gastroc-Soleus - 5/5. Left Gastroc-Soleus - 5/5. Right EHL - 5/5. Left EHL - 5/5. General Assessment of Reflexes  Right Ankle - Clonus is not present. Left Ankle - Clonus is not present. Reflexes (Dermatomes)  2/2 Normal - Left Achilles (L5-S2), Left Knee (L2-4), Right Achilles (L5-S2) and Right Knee (L2-4). Musculoskeletal  Global Assessment  Examination of related systems reveals - well-developed, well-nourished, in no acute distress, alert and oriented x 3 and normal coordination. Spine/Ribs/Pelvis  Lumbosacral Spine - Examination of the lumbosacral spine reveals - no known fractures or deformities. Inspection and Palpation - Tenderness - moderate. Assessment of pain reveals the following findings - The pain is characterized as - moderate. Location - pain refers to lower back bilaterally. Dr. Shakila Johnston was available for immediate consult during this encounter. An electronic signature was used to authenticate this note.   -- SHADE Hernadez

## 2021-12-29 DIAGNOSIS — Z98.1 S/P LUMBAR FUSION: Primary | ICD-10-CM

## 2021-12-29 RX ORDER — TRAMADOL HYDROCHLORIDE 50 MG/1
50 TABLET ORAL
Qty: 50 TABLET | Refills: 0 | Status: SHIPPED | OUTPATIENT
Start: 2021-12-29 | End: 2022-01-01

## 2022-01-14 DIAGNOSIS — M51.36 DDD (DEGENERATIVE DISC DISEASE), LUMBAR: ICD-10-CM

## 2022-01-14 DIAGNOSIS — M48.062 LUMBAR STENOSIS WITH NEUROGENIC CLAUDICATION: ICD-10-CM

## 2022-01-14 DIAGNOSIS — Z98.1 S/P LUMBAR FUSION: Primary | ICD-10-CM

## 2022-01-14 RX ORDER — TRAMADOL HYDROCHLORIDE 50 MG/1
50 TABLET ORAL
Qty: 50 TABLET | Refills: 0 | Status: SHIPPED | OUTPATIENT
Start: 2022-01-14 | End: 2022-01-28

## 2022-01-14 RX ORDER — TRAMADOL HYDROCHLORIDE 50 MG/1
50 TABLET ORAL
Qty: 50 TABLET | Refills: 0 | Status: CANCELLED | OUTPATIENT
Start: 2022-01-14 | End: 2022-01-17

## 2022-03-17 ENCOUNTER — OFFICE VISIT (OUTPATIENT)
Dept: ORTHOPEDIC SURGERY | Age: 62
End: 2022-03-17
Payer: COMMERCIAL

## 2022-03-17 VITALS — BODY MASS INDEX: 30.55 KG/M2 | WEIGHT: 207 LBS

## 2022-03-17 DIAGNOSIS — Z98.1 S/P LUMBAR FUSION: Primary | ICD-10-CM

## 2022-03-17 DIAGNOSIS — M54.50 CHRONIC BILATERAL LOW BACK PAIN WITHOUT SCIATICA: ICD-10-CM

## 2022-03-17 DIAGNOSIS — G89.29 CHRONIC BILATERAL LOW BACK PAIN WITHOUT SCIATICA: ICD-10-CM

## 2022-03-17 PROCEDURE — 99213 OFFICE O/P EST LOW 20 MIN: CPT | Performed by: ORTHOPAEDIC SURGERY

## 2022-03-17 NOTE — PROGRESS NOTES
1. Have you been to the ER, urgent care clinic since your last visit? Hospitalized since your last visit? No    2. Have you seen or consulted any other health care providers outside of the [address] [place] [address] since your last visit? Include any pap smears or colon screening.  No    Chief Complaint   Patient presents with    Surgical Follow-up     Sx 9/20/2021 L5/S1 Fusion (6mo PO)

## 2022-03-17 NOTE — PATIENT INSTRUCTIONS
Spondylolysis and Spondylolisthesis: Exercises  Introduction  Here are some examples of exercises for you to try. The exercises may be suggested for a condition or for rehabilitation. Start each exercise slowly. Ease off the exercises if you start to have pain. You will be told when to start these exercises and which ones will work best for you. How to do the exercises  Single knee-to-chest    1. Lie on your back with your knees bent and your feet flat on the floor. You can put a small pillow under your head and neck if it is more comfortable. 2. Bring one knee to your chest, keeping the other foot flat on the floor. 3. Keep your lower back pressed to the floor. Hold for 15 to 30 seconds. 4. Relax, and lower the knee to the starting position. 5. Repeat with the other leg. Repeat 2 to 4 times with each leg. 6. To get more stretch, put your other leg flat on the floor while pulling your knee to your chest.  Double knee-to-chest    1. Lie on your back with your knees bent and your feet flat on the floor. You can put a small pillow under your head and neck if it is more comfortable. 2. Bring both knees to your chest.  3. Keep your lower back pressed to the floor. Hold for 15 to 30 seconds. 4. Relax, and lower your knees to the starting position. 5. Repeat 2 to 4 times. Alternate arm and leg (bird dog) exercise    Do this exercise slowly. Try to keep your body straight at all times. 1. Start on the floor, on your hands and knees. 2. Tighten your belly muscles by pulling your belly button in toward your spine. Be sure you continue to breathe normally and do not hold your breath. 3. Raise one arm off the floor, and hold it straight out in front of you. Be careful not to let your shoulder drop down, because that will twist your trunk. 4. Hold for about 6 seconds, then lower your arm and switch to your other arm. 5. Repeat 8 to 12 times on each arm.   6. When you can do this exercise with ease and no pain, repeat steps 1 through 5. But this time do it with one leg raised off the floor, holding your leg straight out behind you. Be careful not to let your hip drop down, because that will twist your trunk. 7. When holding your leg straight out becomes easier, try raising your opposite arm at the same time, and repeat steps 1 through 5. Bridging    1. Lie on your back with both knees bent. Your knees should be bent about 90 degrees. 2. Then push your feet into the floor, squeeze your buttocks, and lift your hips off the floor until your shoulders, hips, and knees are all in a straight line. 3. Hold for about 6 seconds as you continue to breathe normally, and then slowly lower your hips back down to the floor and rest for up to 10 seconds. 4. Repeat 8 to 12 times. Curl-ups    1. Lie on the floor on your back with your knees bent at a 90-degree angle. Your feet should be flat on the floor, about 12 inches from your buttocks. 2. Cross your arms over your chest. If this bothers your neck, try putting your hands behind your neck (not your head), with your elbows spread apart. 3. Slowly tighten your belly muscles and raise your shoulder blades off the floor. 4. Keep your head in line with your body, and do not press your chin to your chest.  5. Hold this position for 1 or 2 seconds, then slowly lower yourself back down to the floor. 6. Repeat 8 to 12 times. Plank    Do this exercise slowly. Try to keep your body straight at all times, and do not let one hip drop lower than the other. 1. Lie on your stomach, resting your upper body on your forearms. 2. Tighten your belly muscles by pulling your belly button in toward your spine. 3. Keeping your knees on the floor, press down with your forearms to lift your upper body off the floor. 4. Hold for about 6 seconds, then lower your body to the floor. Rest for up to 10 seconds. 5. Repeat 8 to 12 times.   6. Over time, work up to holding for 15 to 30 seconds each time.  7. If this exercise is easy to do with your knees on the floor, try doing this exercise with your knees and legs straight, supported by your toes on the floor. Follow-up care is a key part of your treatment and safety. Be sure to make and go to all appointments, and call your doctor if you are having problems. It's also a good idea to know your test results and keep a list of the medicines you take. Where can you learn more? Go to http://www.hunt.com/  Enter M245 in the search box to learn more about \"Spondylolysis and Spondylolisthesis: Exercises. \"  Current as of: July 1, 2021               Content Version: 13.2  © 2006-2022 [employer]. Care instructions adapted under license by [employer] (which disclaims liability or warranty for this information). If you have questions about a medical condition or this instruction, always ask your healthcare professional. [address] any warranty or liability for your use of this information.

## 2022-03-17 NOTE — PROGRESS NOTES
Jonny Brand (: 1960) is a 64 y.o. male, patient, here for evaluation of the following chief complaint(s):  Surgical Follow-up (Sx 2021 L5/S1 Fusion (6mo PO))       ASSESSMENT/PLAN:    Below is the assessment and plan developed based on review of pertinent history, physical exam, labs, studies, and medications. Overall he is doing very well. He will increase activities. He is weaned off most of his medications. He has just some mild mechanical lower back pain. See me back in 3 months for repeat x-rays. We have recommended over-the-counter medications at this time. He will use Tylenol or Advil as needed for any of the daytime symptoms. He can use Benadryl at night if he would like. 1. S/P lumbar fusion  -     XR SPINE LUMB 2 OR 3 V; Future  2. Chronic bilateral low back pain without sciatica      No follow-ups on file. SUBJECTIVE/OBJECTIVE:  Jonny Brand (: 1960) is a 64 y.o. male. No flowsheet data found. He comes in today for 6-month visit. Overall he is stable after his fusion. He does have some mild anxiety at night and some mild stiffness at night but he has been able to wean off most of his medications. He has been able to increase his activities and is now playing some golf. He denies any radicular symptoms. He denies any bowel bladder difficulties. Imaging:    XR Results (most recent):  Results from Appointment encounter on 22    XR SPINE LUMB 2 OR 3 V    Narrative  AP and lateral of the lumbar spine reviewed today. He has stable L5-S1 MIS fusion. No acute changes noted. Stable hardware. MRI Results (most recent):  No results found for this or any previous visit. Allergies   Allergen Reactions    Morphine Anaphylaxis       Current Outpatient Medications   Medication Sig    gabapentin (NEURONTIN) 300 mg capsule Take 1 Capsule by mouth three (3) times daily. Max Daily Amount: 900 mg.     cyclobenzaprine (FLEXERIL) 10 mg tablet Take 1 Tablet by mouth three (3) times daily as needed for Muscle Spasm(s).  cyclobenzaprine (FLEXERIL) 10 mg tablet Take 1 Tablet by mouth three (3) times daily as needed for Muscle Spasm(s). (Patient taking differently: Take 10 mg by mouth three (3) times daily as needed for Muscle Spasm(s). Indications: muscle spasm)    cephalexin (KEFLEX) 750 mg capsule Take 750 mg by mouth four (4) times daily.  naloxone (NARCAN) 4 mg/actuation nasal spray Use 1 spray intranasally, then discard. Repeat with new spray every 2 min as needed for opioid overdose symptoms, alternating nostrils. Indications: decrease in rate & depth of breathing due to opioid drug    ondansetron (ZOFRAN ODT) 4 mg disintegrating tablet Take 1 Tablet by mouth every eight (8) hours as needed for Nausea or Vomiting.  warfarin (Coumadin) 5 mg tablet Take 5 mg by mouth daily.  warfarin (COUMADIN) 1 mg tablet Take 1 mg by mouth daily.  acetaminophen (Tylenol Extra Strength) 500 mg tablet Take 2 Tablets by mouth every six (6) hours as needed for Pain.  cholecalciferol (Vitamin D3) (1000 Units /25 mcg) tablet Take 1 Capsule by mouth daily.  ascorbic acid, vitamin C, (Vitamin C) 500 mg tablet Take 1,000 mg by mouth daily. No current facility-administered medications for this visit.        Past Medical History:   Diagnosis Date    Arthritis     Chronic pain     Diverticulitis     Factor 5 Leiden mutation, heterozygous ([address]     Pulmonary embolism ([address] 2001        Past Surgical History:   Procedure Laterality Date    HX COLOSTOMY  2006    HX GI      COLONOSCOPY    HX GI  2007    COLOSTOMY REVERSED     HX KNEE ARTHROSCOPY Left 2001    X2    HX ORTHOPAEDIC Left 2019    WRIST    HX ROTATOR CUFF REPAIR Left 2006    MS ABDOMEN SURGERY PROC UNLISTED  2006    PARTIAL COLECTOMY       Family History   Problem Relation Age of Onset    Cancer Mother         BREAST    No Known Problems Son     No Known Problems Daughter    [facility] Anesth Problems Neg Hx         Social History     Tobacco Use    Smoking status: Former Smoker     Packs/day: 1.00     Years: 8.00     Pack years: 8.00     Quit date:      Years since quittin.2    Smokeless tobacco: Never Used   Vaping Use    Vaping Use: Never used   Substance Use Topics    Alcohol use: Yes     Alcohol/week: 1.0 standard drink     Types: 1 Shots of liquor per week    Drug use: Never        Review of Systems       Vitals: Wt 207 lb (93.9 kg)   BMI 30.55 kg/m²    Body mass index is 30.55 kg/m². Ortho Exam       Integumentary  Assessment of Surgical Incision - healing and consistent with normal anticipated wound healing. Neurologic  Sensory  Light Touch - Intact - Globally. Overall Assessment of Muscle Strength and Tone reveals  Lower Extremities - Right Iliopsoas - 5/5. Left Iliopsoas - 5/5. Right Tibialis Anterior - 5/5. Left Tibialis Anterior - 5/5. Right Gastroc-Soleus - 5/5. Left Gastroc-Soleus - 5/5. Right EHL - 5/5. Left EHL - 5/5. General Assessment of Reflexes  Right Ankle - Clonus is not present. Left Ankle - Clonus is not present. Reflexes (Dermatomes)  2/2 Normal - Left Achilles (L5-S2), Left Knee (L2-4), Right Achilles (L5-S2) and Right Knee (L2-4). Musculoskeletal  Global Assessment  Examination of related systems reveals - well-developed, well-nourished, in no acute distress, alert and oriented x 3 and normal coordination. Spine/Ribs/Pelvis  Lumbosacral Spine - Examination of the lumbosacral spine reveals - no known fractures or deformities. Inspection and Palpation - Tenderness - moderate. Assessment of pain reveals the following findings - The pain is characterized as - moderate. Location - pain refers to lower back bilaterally. An electronic signature was used to authenticate this note.   -- Rayo Rodriguez MD

## 2022-03-18 PROBLEM — M48.062 LUMBAR STENOSIS WITH NEUROGENIC CLAUDICATION: Status: ACTIVE | Noted: 2021-09-20

## 2022-03-19 PROBLEM — M51.369 DDD (DEGENERATIVE DISC DISEASE), LUMBAR: Status: ACTIVE | Noted: 2021-09-20

## 2022-03-24 PROBLEM — M54.50 CHRONIC BILATERAL LOW BACK PAIN WITHOUT SCIATICA: Status: ACTIVE | Noted: 2022-03-17

## 2022-03-24 PROBLEM — Z98.1 S/P LUMBAR FUSION: Status: ACTIVE | Noted: 2022-03-17

## 2022-03-24 PROBLEM — G89.29 CHRONIC BILATERAL LOW BACK PAIN WITHOUT SCIATICA: Status: ACTIVE | Noted: 2022-03-17

## 2022-12-08 ENCOUNTER — OFFICE VISIT (OUTPATIENT)
Dept: ORTHOPEDIC SURGERY | Age: 62
End: 2022-12-08
Payer: COMMERCIAL

## 2022-12-08 VITALS — HEIGHT: 69 IN | WEIGHT: 207 LBS | BODY MASS INDEX: 30.66 KG/M2

## 2022-12-08 DIAGNOSIS — M51.36 LUMBAR DEGENERATIVE DISC DISEASE: ICD-10-CM

## 2022-12-08 DIAGNOSIS — Z98.1 S/P LUMBAR FUSION: Primary | ICD-10-CM

## 2022-12-08 RX ORDER — MELOXICAM 7.5 MG/1
7.5 TABLET ORAL 2 TIMES DAILY
Qty: 60 TABLET | Refills: 1 | Status: SHIPPED | OUTPATIENT
Start: 2022-12-08

## 2022-12-08 RX ORDER — METHOCARBAMOL 750 MG/1
750 TABLET, FILM COATED ORAL 3 TIMES DAILY
Qty: 60 TABLET | Refills: 0 | Status: SHIPPED | OUTPATIENT
Start: 2022-12-08

## 2022-12-08 NOTE — PROGRESS NOTES
1. Have you been to the ER, urgent care clinic since your last visit? Hospitalized since your last visit? No    2. Have you seen or consulted any other health care providers outside of the [address] [place] [address] since your last visit? Include any pap smears or colon screening.  No    Chief Complaint   Patient presents with    Back Pain     S/P L5/S1 Fusion (9/20/21)

## 2022-12-08 NOTE — PROGRESS NOTES
Kianna Tapia (: 1960) is a 58 y.o. male, patient, here for evaluation of the following chief complaint(s):  Back Pain (S/P L5/S1 Fusion (21))       ASSESSMENT/PLAN:    Below is the assessment and plan developed based on review of pertinent history, physical exam, labs, studies, and medications. Overall he was doing better until approximately a month ago. He was doing a little bit of progressive core strengthening and now has repeat right hip and buttock pain. He has been doing the physical therapy consistently but still is not having relief of symptoms. It is mostly in the axial nature. I am going to start an anti-inflammatory and a muscle relaxant. I will get an MRI to make sure is not had any degeneration above his L5-S1 fusion. His fusion appears stable    1. S/P lumbar fusion  -     XR SPINE LUMB 2 OR 3 V; Future  -     MRI LUMB SPINE WO CONT; Future  2. Lumbar degenerative disc disease      No follow-ups on file. SUBJECTIVE/OBJECTIVE:  Kianna Tapia (: 1960) is a 58 y.o. male. No flowsheet data found. He comes in today for 12-month visit. Overall he is stable after his fusion. He was doing really well until approximately a month or 2 ago. He was doing some aggressive lifting and has had some reactivation of some right-sided lower back pain and stiffness. He does not take any medications. He is concerned because this has not improved despite the conservative treatment. He denies any bowel bladder difficulties. .    Imaging:    XR Results (most recent):  Results from Appointment encounter on 22    XR SPINE LUMB 2 OR 3 V    Narrative  AP and lateral of the lumbar spine demonstrates a stable L5-S1 fusion. No acute changes noted. MRI Results (most recent):  No results found for this or any previous visit.          Allergies   Allergen Reactions    Morphine Anaphylaxis       Current Outpatient Medications   Medication Sig    meloxicam (MOBIC) 7.5 mg tablet Take 1 Tablet by mouth two (2) times a day. methocarbamoL (ROBAXIN) 750 mg tablet Take 1 Tablet by mouth three (3) times daily. gabapentin (NEURONTIN) 300 mg capsule Take 1 Capsule by mouth three (3) times daily. Max Daily Amount: 900 mg.    cyclobenzaprine (FLEXERIL) 10 mg tablet Take 1 Tablet by mouth three (3) times daily as needed for Muscle Spasm(s). cyclobenzaprine (FLEXERIL) 10 mg tablet Take 1 Tablet by mouth three (3) times daily as needed for Muscle Spasm(s). (Patient taking differently: Take 10 mg by mouth three (3) times daily as needed for Muscle Spasm(s). Indications: muscle spasm)    cephalexin (KEFLEX) 750 mg capsule Take 750 mg by mouth four (4) times daily. naloxone (NARCAN) 4 mg/actuation nasal spray Use 1 spray intranasally, then discard. Repeat with new spray every 2 min as needed for opioid overdose symptoms, alternating nostrils. Indications: decrease in rate & depth of breathing due to opioid drug    ondansetron (ZOFRAN ODT) 4 mg disintegrating tablet Take 1 Tablet by mouth every eight (8) hours as needed for Nausea or Vomiting.    warfarin (COUMADIN) 5 mg tablet Take 5 mg by mouth daily. warfarin (COUMADIN) 1 mg tablet Take 1 mg by mouth daily. acetaminophen (TYLENOL) 500 mg tablet Take 2 Tablets by mouth every six (6) hours as needed for Pain. cholecalciferol (VITAMIN D3) (1000 Units /25 mcg) tablet Take 1 Capsule by mouth daily. ascorbic acid, vitamin C, (VITAMIN C) 500 mg tablet Take 1,000 mg by mouth daily. No current facility-administered medications for this visit.        Past Medical History:   Diagnosis Date    Arthritis     Chronic pain     Diverticulitis     Factor 5 Leiden mutation, heterozygous ([address]     Pulmonary embolism ([address] 2001        Past Surgical History:   Procedure Laterality Date    HX COLOSTOMY  2006    HX GI      COLONOSCOPY    HX GI  2007    COLOSTOMY REVERSED     HX KNEE ARTHROSCOPY Left 2001    X2    HX ORTHOPAEDIC Left 2019    WRIST HX ROTATOR CUFF REPAIR Left 2006    [place] ABDOMEN SURGERY PROC UNLISTED  2006    PARTIAL COLECTOMY       Family History   Problem Relation Age of Onset    Cancer Mother         BREAST    No Known Problems Son     No Known Problems Daughter     Anesth Problems Neg Hx         Social History     Tobacco Use    Smoking status: Former     Packs/day: 1.00     Years: 8.00     Pack years: 8.00     Types: Cigarettes     Quit date:      Years since quittin.9    Smokeless tobacco: Never   Vaping Use    Vaping Use: Never used   Substance Use Topics    Alcohol use: Yes     Alcohol/week: 1.0 standard drink     Types: 1 Shots of liquor per week    Drug use: Never        Review of Systems       Vitals:  Ht 5' 9\" (1.753 m)   Wt 207 lb (93.9 kg)   BMI 30.57 kg/m²    Body mass index is 30.57 kg/m². Ortho Exam       Integumentary  Assessment of Surgical Incision - healing and consistent with normal anticipated wound healing. Neurologic  Sensory  Light Touch - Intact - Globally. Overall Assessment of Muscle Strength and Tone reveals  Lower Extremities - Right Iliopsoas - 5/5. Left Iliopsoas - 5/5. Right Tibialis Anterior - 5/5. Left Tibialis Anterior - 5/5. Right Gastroc-Soleus - 5/5. Left Gastroc-Soleus - 5/5. Right EHL - 5/5. Left EHL - 5/5. General Assessment of Reflexes  Right Ankle - Clonus is not present. Left Ankle - Clonus is not present. Reflexes (Dermatomes)  2/2 Normal - Left Achilles (L5-S2), Left Knee (L2-4), Right Achilles (L5-S2) and Right Knee (L2-4). Musculoskeletal  Global Assessment  Examination of related systems reveals - well-developed, well-nourished, in no acute distress, alert and oriented x 3 and normal coordination. Spine/Ribs/Pelvis  Lumbosacral Spine - Examination of the lumbosacral spine reveals - no known fractures or deformities. Inspection and Palpation - Tenderness - moderate. Assessment of pain reveals the following findings - The pain is characterized as - moderate.  Location - pain refers to lower back bilaterally. An electronic signature was used to authenticate this note.   -- Fabi Yang MD

## 2022-12-08 NOTE — PATIENT INSTRUCTIONS
Spondylolysis and Spondylolisthesis: Exercises  Introduction  Here are some examples of exercises for you to try. The exercises may be suggested for a condition or for rehabilitation. Start each exercise slowly. Ease off the exercises if you start to have pain. You will be told when to start these exercises and which ones will work best for you. How to do the exercises  Single knee-to-chest  Lie on your back with your knees bent and your feet flat on the floor. You can put a small pillow under your head and neck if it is more comfortable. Bring one knee to your chest, keeping the other foot flat on the floor. Keep your lower back pressed to the floor. Hold for 15 to 30 seconds. Relax, and lower the knee to the starting position. Repeat with the other leg. Repeat 2 to 4 times with each leg. To get more stretch, put your other leg flat on the floor while pulling your knee to your chest.  Double knee-to-chest  Lie on your back with your knees bent and your feet flat on the floor. You can put a small pillow under your head and neck if it is more comfortable. Bring both knees to your chest.  Keep your lower back pressed to the floor. Hold for 15 to 30 seconds. Relax, and lower your knees to the starting position. Repeat 2 to 4 times. Alternate arm and leg (bird dog) exercise  Do this exercise slowly. Try to keep your body straight at all times. Start on the floor, on your hands and knees. Tighten your belly muscles by pulling your belly button in toward your spine. Be sure you continue to breathe normally and do not hold your breath. Raise one arm off the floor, and hold it straight out in front of you. Be careful not to let your shoulder drop down, because that will twist your trunk. Hold for about 6 seconds, then lower your arm and switch to your other arm. Repeat 8 to 12 times on each arm. When you can do this exercise with ease and no pain, repeat steps 1 through 5.  But this time do it with one leg raised off the floor, holding your leg straight out behind you. Be careful not to let your hip drop down, because that will twist your trunk. When holding your leg straight out becomes easier, try raising your opposite arm at the same time, and repeat steps 1 through 5. Bridging  Lie on your back with both knees bent. Your knees should be bent about 90 degrees. Then push your feet into the floor, squeeze your buttocks, and lift your hips off the floor until your shoulders, hips, and knees are all in a straight line. Hold for about 6 seconds as you continue to breathe normally, and then slowly lower your hips back down to the floor and rest for up to 10 seconds. Repeat 8 to 12 times. Curl-ups  Lie on the floor on your back with your knees bent at a 90-degree angle. Your feet should be flat on the floor, about 12 inches from your buttocks. Cross your arms over your chest. If this bothers your neck, try putting your hands behind your neck (not your head), with your elbows spread apart. Slowly tighten your belly muscles and raise your shoulder blades off the floor. Keep your head in line with your body, and do not press your chin to your chest.  Hold this position for 1 or 2 seconds, then slowly lower yourself back down to the floor. Repeat 8 to 12 times. Plank  Do this exercise slowly. Try to keep your body straight at all times, and do not let one hip drop lower than the other. Lie on your stomach, resting your upper body on your forearms. Tighten your belly muscles by pulling your belly button in toward your spine. Keeping your knees on the floor, press down with your forearms to lift your upper body off the floor. Hold for about 6 seconds, then lower your body to the floor. Rest for up to 10 seconds. Repeat 8 to 12 times. Over time, work up to holding for 15 to 30 seconds each time.   If this exercise is easy to do with your knees on the floor, try doing this exercise with your knees and legs straight, supported by your toes on the floor. Follow-up care is a key part of your treatment and safety. Be sure to make and go to all appointments, and call your doctor if you are having problems. It's also a good idea to know your test results and keep a list of the medicines you take. Current as of: March 9, 2022               Content Version: 13.4  © 2006-2022 [employer], [employer]. Care instructions adapted under license by [employer] (which disclaims liability or warranty for this information). If you have questions about a medical condition or this instruction, always ask your healthcare professional. [place] [address] any warranty or liability for your use of this information.

## 2022-12-19 ENCOUNTER — HOSPITAL ENCOUNTER (OUTPATIENT)
Dept: MRI IMAGING | Age: 62
Discharge: HOME | End: 2022-12-19
Attending: ORTHOPAEDIC SURGERY
Payer: COMMERCIAL

## 2022-12-19 DIAGNOSIS — Z98.1 S/P LUMBAR FUSION: ICD-10-CM

## 2022-12-19 PROCEDURE — 72148 MRI LUMBAR SPINE W/O DYE: CPT

## 2023-01-04 ENCOUNTER — OFFICE VISIT (OUTPATIENT)
Dept: ORTHOPEDIC SURGERY | Age: 63
End: 2023-01-04
Payer: COMMERCIAL

## 2023-01-04 VITALS — HEIGHT: 69 IN | WEIGHT: 207 LBS | BODY MASS INDEX: 30.66 KG/M2

## 2023-01-04 DIAGNOSIS — M54.50 ACUTE BILATERAL LOW BACK PAIN WITHOUT SCIATICA: Primary | ICD-10-CM

## 2023-01-04 PROCEDURE — 99214 OFFICE O/P EST MOD 30 MIN: CPT | Performed by: STUDENT IN AN ORGANIZED HEALTH CARE EDUCATION/TRAINING PROGRAM

## 2023-01-04 RX ORDER — METHOCARBAMOL 750 MG/1
750 TABLET, FILM COATED ORAL
Qty: 30 TABLET | Refills: 1 | Status: CANCELLED | OUTPATIENT
Start: 2023-01-04

## 2023-01-04 RX ORDER — TRAMADOL HYDROCHLORIDE 50 MG/1
50 TABLET ORAL
Qty: 30 TABLET | Refills: 0 | Status: SHIPPED | OUTPATIENT
Start: 2023-01-04 | End: 2023-01-12

## 2023-01-04 RX ORDER — METHYLPREDNISOLONE 4 MG/1
TABLET ORAL
Qty: 1 DOSE PACK | Refills: 0 | Status: SHIPPED | OUTPATIENT
Start: 2023-01-04

## 2023-01-04 RX ORDER — TIZANIDINE 2 MG/1
2-4 TABLET ORAL
Qty: 30 TABLET | Refills: 1 | Status: SHIPPED | OUTPATIENT
Start: 2023-01-04

## 2023-01-04 NOTE — PROGRESS NOTES
1. Have you been to the ER, urgent care clinic since your last visit? Hospitalized since your last visit? No    2. Have you seen or consulted any other health care providers outside of the [address] [place] [address] since your last visit? Include any pap smears or colon screening.  No    Chief Complaint   Patient presents with    Back Pain     Lumbar MRI Results 12/19/22

## 2023-01-05 NOTE — PROGRESS NOTES
Jaden Escoto (: 1960) is a 58 y.o. male, patient, here for evaluation of the following chief complaint(s):  Back Pain (Lumbar MRI Results 22)       ASSESSMENT/PLAN:  Below is the assessment and plan developed based on review of pertinent history, physical exam, labs, studies, and medications. Patient presents today for an MRI follow-up. His back and leg symptoms are significantly impacting his ability to perform daily activities, patient is discouraged by the return of back pain. MRI reviewed in detail today, he does have a disc extrusion at L4-5, which is increased compared to prior study. I would like for him to obtain bilateral L4-5 ROMEL's. Patient would prefer for Dr. Stella Boyce to perform these. We will contact him with the next available date. If it is too far out, we will we did discuss sending the [facility] through the hospital system. Patient is unable to take NSAIDs, and Tylenol is not helping with the symptoms. We will try different muscle relaxer prescribed today. Medrol Dosepak and tramadol also prescribed. Patient states he has had prior success with steroid packs. He will follow-up after the injections. 1. Acute bilateral low back pain without sciatica  -     traMADoL (Ultram) 50 mg tablet; Take 1 Tablet by mouth every six (6) hours as needed for Pain for up to 8 days. Max Daily Amount: 200 mg., Normal, Disp-30 Tablet, R-0Supervising Physician: Shubham Romo MD NPI: [id number] DAVID: [id number]      No follow-ups on file. SUBJECTIVE/OBJECTIVE:  Jaden Escoto (: 1960) is a 58 y.o. male. No flowsheet data found. Patient presents today for an MRI follow-up. He is discouraged by his persistent low back pain, as he states it is significantly impacting his abilities to return to his regular activities. He does report intermittent lower extremity radicular symptoms into the lateral aspect of his right lower extremity. He has only been taking Tylenol.   He was prescribed Robaxin at his last visit, but states it has not helped relieve his symptoms. He is currently on a blood thinner, so is unable to tolerate NSAIDs. The rest of his history as noted below-patient was initially progressing well following his lumbar fusion approximately 1 year ago. He states that he was lifting a [employer] tree box in November, 1 and had an onset of worsening pain the following day. He is continues with persistent difficulties with daily activities. He has invested in a  to help with core and back strengthening. No acute changes in bowel or bladder control. No saddle anesthesia. Imaging:    XR Results (most recent):  Results from Appointment encounter on 12/08/22    XR SPINE LUMB 2 OR 3 V    Narrative  AP and lateral of the lumbar spine demonstrates a stable L5-S1 fusion. No acute changes noted. MRI Results (most recent):  Results from [place] encounter on 12/19/22    MRI LUMB SPINE WO CONT    Narrative  EXAM: MRI LUMB SPINE WO CONT  Clinical history: Evaluate arthrodesis  INDICATION: . Arthrodesis status / @[facility]    COMPARISON: May 2021    TECHNIQUE: MR imaging of the lumbar spine was performed using the following  sequences: sagittal T1, T2, STIR;  axial T1, T2.    CONTRAST:  None. FINDINGS:    Status post fusion at L5-S1. Vertebral body heights are maintained. And a  stenosis at L3. The conus medullaris terminates at L1-L2. Signal and caliber of the distal  spinal cord are within normal limits. The paraspinal soft tissues are within normal limits. Lower thoracic spine: No herniation or stenosis. L1-L2: No herniation or stenosis. L2-L3: No herniation or stenosis. L3-L4: Disc desiccation and loss of disc height. Circumferential  bulge/osteophyte. Facet arthropathy. The canal is patent. Foramina are patent    L4-L5: Disc desiccation. Circumferential bulge. Ligamentum flavum hypertrophy. Minimal canal stenosis.  Mild caudally oriented disc extrusion in the right  paracentral region. Mild canal stenosis. Mild bilateral foraminal stenosis,  increased. L5-S1: Status post fusion. The canal and foramina are patent. Impression  Status post fusion at L5-S1. Interval minimal caudally oriented disc extrusion at L4-5 with mild canal and  mild bilateral foraminal stenosis at L4-5, increased compared to prior study. Allergies   Allergen Reactions    Morphine Anaphylaxis       Current Outpatient Medications   Medication Sig    traMADoL (Ultram) 50 mg tablet Take 1 Tablet by mouth every six (6) hours as needed for Pain for up to 8 days. Max Daily Amount: 200 mg.    methylPREDNISolone (MEDROL DOSEPACK) 4 mg tablet Per dose pack instructions    tiZANidine (ZANAFLEX) 2 mg tablet Take 1-2 Tablets by mouth every eight to twelve (8-12) hours as needed for Muscle Spasm(s). meloxicam (MOBIC) 7.5 mg tablet Take 1 Tablet by mouth two (2) times a day. methocarbamoL (ROBAXIN) 750 mg tablet Take 1 Tablet by mouth three (3) times daily. gabapentin (NEURONTIN) 300 mg capsule Take 1 Capsule by mouth three (3) times daily. Max Daily Amount: 900 mg.    cyclobenzaprine (FLEXERIL) 10 mg tablet Take 1 Tablet by mouth three (3) times daily as needed for Muscle Spasm(s). cyclobenzaprine (FLEXERIL) 10 mg tablet Take 1 Tablet by mouth three (3) times daily as needed for Muscle Spasm(s). (Patient taking differently: Take 10 mg by mouth three (3) times daily as needed for Muscle Spasm(s). Indications: muscle spasm)    cephalexin (KEFLEX) 750 mg capsule Take 750 mg by mouth four (4) times daily. naloxone (NARCAN) 4 mg/actuation nasal spray Use 1 spray intranasally, then discard. Repeat with new spray every 2 min as needed for opioid overdose symptoms, alternating nostrils.   Indications: decrease in rate & depth of breathing due to opioid drug    ondansetron (ZOFRAN ODT) 4 mg disintegrating tablet Take 1 Tablet by mouth every eight (8) hours as needed for Nausea or Vomiting.    warfarin (COUMADIN) 5 mg tablet Take 5 mg by mouth daily. warfarin (COUMADIN) 1 mg tablet Take 1 mg by mouth daily. acetaminophen (TYLENOL) 500 mg tablet Take 2 Tablets by mouth every six (6) hours as needed for Pain. cholecalciferol (VITAMIN D3) (1000 Units /25 mcg) tablet Take 1 Capsule by mouth daily. ascorbic acid, vitamin C, (VITAMIN C) 500 mg tablet Take 1,000 mg by mouth daily. No current facility-administered medications for this visit. Past Medical History:   Diagnosis Date    Arthritis     Chronic pain     Diverticulitis     Factor 5 Leiden mutation, heterozygous [address]     Pulmonary embolism ([facility]         Past Surgical History:   Procedure Laterality Date    HX COLOSTOMY  2006    HX GI      COLONOSCOPY    HX GI  2007    COLOSTOMY REVERSED     HX KNEE ARTHROSCOPY Left 2001    X2    HX ORTHOPAEDIC Left 2019    WRIST    HX ROTATOR CUFF REPAIR Left     FL UNLISTED PROCEDURE ABDOMEN PERITONEUM & OMENTUM  2006    PARTIAL COLECTOMY       Family History   Problem Relation Age of Onset    Cancer Mother         BREAST    No Known Problems Son     No Known Problems Daughter     Anesth Problems Neg Hx         Social History     Tobacco Use    Smoking status: Former     Packs/day: 1.00     Years: 8.00     Pack years: 8.00     Types: Cigarettes     Quit date:      Years since quittin.0    Smokeless tobacco: Never   Vaping Use    Vaping Use: Never used   Substance Use Topics    Alcohol use: Yes     Alcohol/week: 1.0 standard drink     Types: 1 Shots of liquor per week    Drug use: Never        Review of Systems   Constitutional:  Negative for chills and fever. Musculoskeletal:  Positive for arthralgias, back pain and myalgias. Neurological:  Positive for numbness. All other systems reviewed and are negative. Vitals:  Ht 5' 9\" (1.753 m)   Wt 207 lb (93.9 kg)   BMI 30.57 kg/m²    Body mass index is 30.57 kg/m².     Physical Exam  Integumentary  Assessment of Surgical Incision - healing and consistent with normal anticipated wound healing. Neurologic  Sensory  Light Touch - Intact - Globally. Overall Assessment of Muscle Strength and Tone reveals  Lower Extremities - Right Iliopsoas - 5/5. Left Iliopsoas - 5/5. Right Tibialis Anterior - 5/5. Left Tibialis Anterior - 5/5. Right Gastroc-Soleus - 5/5. Left Gastroc-Soleus - 5/5. Right EHL - 5/5. Left EHL - 5/5. General Assessment of Reflexes  Right Ankle - Clonus is not present. Left Ankle - Clonus is not present. Reflexes (Dermatomes)  2/2 Normal - Left Achilles (L5-S2), Left Knee (L2-4), Right Achilles (L5-S2) and Right Knee (L2-4). Musculoskeletal  Global Assessment  Examination of related systems reveals - well-developed, well-nourished, in no acute distress, alert and oriented x 3 and normal coordination. Spine/Ribs/Pelvis  Lumbosacral Spine - Examination of the lumbosacral spine reveals - no known fractures or deformities. Inspection and Palpation - Tenderness - moderate. Assessment of pain reveals the following findings - The pain is characterized as - moderate. Location - pain refers to lower back, right greater than left. Positive straight leg raise test on the right. Dr. Mal Lopez was available for immediate consult during this encounter. An electronic signature was used to authenticate this note.   -- SHADE Betancourt

## 2023-01-11 DIAGNOSIS — M51.36 LUMBAR DEGENERATIVE DISC DISEASE: ICD-10-CM

## 2023-01-11 DIAGNOSIS — M54.50 ACUTE BILATERAL LOW BACK PAIN WITHOUT SCIATICA: Primary | ICD-10-CM

## 2023-01-11 DIAGNOSIS — Z98.1 S/P LUMBAR FUSION: ICD-10-CM

## 2023-02-08 DIAGNOSIS — M51.26 HNP (HERNIATED NUCLEUS PULPOSUS), LUMBAR: ICD-10-CM

## 2023-02-08 DIAGNOSIS — Z98.1 S/P LUMBAR FUSION: Primary | ICD-10-CM

## 2023-02-08 RX ORDER — HYDROCODONE BITARTRATE AND ACETAMINOPHEN 5; 325 MG/1; MG/1
1 TABLET ORAL
Qty: 28 TABLET | Refills: 0 | Status: SHIPPED | OUTPATIENT
Start: 2023-02-08 | End: 2023-02-09

## 2023-02-09 ENCOUNTER — TELEPHONE (OUTPATIENT)
Dept: ORTHOPEDIC SURGERY | Age: 63
End: 2023-02-09

## 2023-02-09 NOTE — TELEPHONE ENCOUNTER
As per Dr. Azul Kelly, patient notified of a visit for any more medication refills.  He has an visit on 02/16 at which time he can discuss his pain with Dr. Daryle Brocks RN, BSN  Registered Nurse to [address] [place] [address]    Office: [phone]  Fax: [phone]

## 2023-02-16 ENCOUNTER — OFFICE VISIT (OUTPATIENT)
Dept: ORTHOPEDIC SURGERY | Age: 63
End: 2023-02-16
Payer: COMMERCIAL

## 2023-02-16 VITALS — HEIGHT: 69 IN | WEIGHT: 207 LBS | BODY MASS INDEX: 30.66 KG/M2

## 2023-02-16 DIAGNOSIS — M48.062 LUMBAR STENOSIS WITH NEUROGENIC CLAUDICATION: ICD-10-CM

## 2023-02-16 DIAGNOSIS — Z98.1 S/P LUMBAR FUSION: Primary | ICD-10-CM

## 2023-02-16 DIAGNOSIS — M54.50 CHRONIC BILATERAL LOW BACK PAIN WITHOUT SCIATICA: ICD-10-CM

## 2023-02-16 DIAGNOSIS — Z98.1 S/P LUMBAR FUSION: ICD-10-CM

## 2023-02-16 DIAGNOSIS — G89.29 CHRONIC BILATERAL LOW BACK PAIN WITHOUT SCIATICA: ICD-10-CM

## 2023-02-16 DIAGNOSIS — M51.36 LUMBAR DEGENERATIVE DISC DISEASE: ICD-10-CM

## 2023-02-16 RX ORDER — HYDROCODONE BITARTRATE AND ACETAMINOPHEN 5; 325 MG/1; MG/1
1 TABLET ORAL
Qty: 40 TABLET | Refills: 0 | Status: SHIPPED | OUTPATIENT
Start: 2023-02-16 | End: 2023-02-16 | Stop reason: SDUPTHER

## 2023-02-16 NOTE — PROGRESS NOTES
1. Have you been to the ER, urgent care clinic since your last visit? Hospitalized since your last visit? No    2. Have you seen or consulted any other health care providers outside of the [address] [place] [address] since your last visit? Include any pap smears or colon screening.  No    Chief Complaint   Patient presents with    Back Pain     Follow Up Bilateral L4/5 ROMEL with Dr Sukhwinder Pineda @ [facility] on 1/17/23

## 2023-02-16 NOTE — LETTER
2/16/2023    Patient: Isabel Dong   YOB: 1960   Date of Visit: 2/16/2023     Yanet Baez MD  [address] [place] [phone]  Via Fax: [phone]    Dear Yanet Baez MD,      Thank you for referring Mr. Toño Gillis to [facility] for evaluation. My notes for this consultation are attached. If you have questions, please do not hesitate to call me. I look forward to following your patient along with you.       Sincerely,    Modesta Hutchins MD

## 2023-02-16 NOTE — PATIENT INSTRUCTIONS
Lumbar Spinal Fusion: Before Your Surgery  What is lumbar spinal fusion? Spinal fusion is surgery that joins, or fuses, two or more vertebrae together. The joints will no longer be able to move. The surgery is also called arthrodesis. Most of the time, bone from your pelvic bone or from a bone bank is used. Or sometimes human-made bone is used. This bone is used to make a \"bridge\" between the vertebrae to be joined. Metal rods, wires, or screws are often attached to the vertebrae. This will hold them together until new bone grows between them. Spinal fusion surgery usually takes a few hours. It involves making a cut in your back, belly, or side. The cuts, called incisions, leave scars that fade with time. You can expect your back to feel stiff and sore after surgery. You will be given pain medicine. You will probably get up and walk at the hospital. [facility] will have a short hospital stay. It may take 4 to 6 weeks to get back to doing simple activities, such as light housework or office work. How do you prepare for surgery? Surgery can be stressful. This information will help you understand what you can expect. And it will help you safely prepare for surgery. Preparing for surgery    Be sure you have someone to take you home. Anesthesia and pain medicine will make it unsafe for you to drive or get home on your own. Understand exactly what surgery is planned, along with the risks, benefits, and other options. If you take a medicine that prevents blood clots, your doctor may tell you to stop taking it before your surgery. Or your doctor may tell you to keep taking it. (These medicines include aspirin and other blood thinners.) Make sure that you understand exactly what your doctor wants you to do. Tell your doctor ALL the medicines, vitamins, supplements, and herbal remedies you take. Some may increase the risk of problems during your surgery.  Your doctor will tell you if you should stop taking any of them before the surgery and how soon to do it. Make sure your doctor and the hospital have a copy of your advance directive. If you don't have one, you may want to prepare one. It lets others know your health care wishes. It's a good thing to have before any type of surgery or procedure. What happens on the day of surgery? Follow the instructions exactly about when to stop eating and drinking. If you don't, your surgery may be canceled. If your doctor told you to take your medicines on the day of surgery, take them with only a sip of water. Take a bath or shower before you come in for your surgery. Do not apply lotions, perfumes, deodorants, or nail polish. Do not shave the surgical site yourself. Take off all jewelry and piercings. And take out contact lenses, if you wear them. At the hospital or surgery center   Bring a picture ID. The area for surgery is often marked to make sure there are no errors. You will be kept comfortable and safe by your anesthesia provider. You will be asleep during the surgery. Surgery will take a few hours. When should you call your doctor? You have questions or concerns. You do not understand how to prepare for your surgery. You become ill before surgery (such as fever, cold or flu, chest pain, or shortness of breath). You need to reschedule or have changed your mind about having the surgery. Where can you learn more? Go to http://www.gray.com/  Enter M112751 in the search box to learn more about \"Lumbar Spinal Fusion: Before Your Surgery. \"  Current as of: March 9, 2022               Content Version: 13.4  © 8385-0695 [employer]. Care instructions adapted under license by [employer] (which disclaims liability or warranty for this information).  If you have questions about a medical condition or this instruction, always ask your healthcare professional. Gilford Sayres disclaims any warranty or liability for your use of this information.

## 2023-02-16 NOTE — LETTER
CONTROLLED SUBSTANCE MEDICATION AGREEMENT  Patient Name: Prem Prabhakar  Patient YOB: 1960     I understand, that controlled substance medications may be used to help better manage my symptoms and to improve my ability to function at home, work and in social settings. However, I also understand that these medications do have risks, which have been discussed with me, including possible development of physical or psychological dependence. I understand that successful treatment requires mutual trust and honesty between me and my provider. I understand and agree that following this Medication Agreement is necessary in continuing my provider-patient relationship and the success of my treatment plan. Explanation from my Provider: Benefits and Goals of Controlled Substance Medications: There are two potential goals for your treatment: (1) decreased pain and suffering (2) improved daily life functions. There are many possible treatments for your chronic condition(s). Alternatives such as physical therapy, yoga, massage, home daily exercise, meditation, relaxation techniques, injections, chiropractic manipulations, surgery, cognitive therapy, hypnosis and many medications that are not habit-forming may be used. Use of controlled substance medications may be helpful, but they are unlikely to resolve all symptoms or restore all function. Explanation from my Provider: Risks of Controlled Substance Medications:   Opioid pain medications: These medications can lead to problems such as addiction/dependence, sedation, lightheadedness/dizziness, memory issues, falls, constipation, nausea, or vomiting. They may also impair the ability to drive or operate machinery. Additionally, these medications may lower testosterone levels, leading to loss of bone strength, stamina and sex drive.   They may cause problems with breathing, sleep apnea and reduced coughing, which is especially dangerous for patients with lung disease. Overdose or dangerous interactions with alcohol and other medications may occur, leading to death. Hyperalgesia may develop, which means patients receiving opioids for the treatment of pain may become more sensitive to certain painful stimuli, and in some cases, experience pain from ordinarily non-painful stimuli. Women between the ages of 14-53 who could become pregnant should carefully weigh the risks and benefits of opioids with their physicians, as these medications increase the risk of pregnancy complications, including miscarriage,  delivery and stillbirth. It is also possible for babies to be born addicted to opioids. Opioid dependence withdrawal symptoms may include; feelings of uneasiness, increased pain, irritability, belly pain, diarrhea, sweats and goose-flesh. Testosterone replacement therapy:  Potential side effects include increased risk of stroke and heart attack, blood clots, increased blood pressure, increased cholesterol, enlarged prostate, sleep apnea, irritability/aggression and other mood disorders, and decreased fertility. Isha Mcgee (1960)             Page 1 of 4    Initials:_______    Benzodiazepines and non-benzodiazepine sleep medications: These medications can lead to problems such as addiction/dependence, sedation, fatigue, lightheadedness, dizziness, incoordination, falls, depression, hallucinations, and impaired judgment, memory and concentration. The ability to drive and operate machinery may also be affected. Abnormal sleep-related behaviors have been reported, including sleepwalking, driving, making telephone calls, eating, or having sex while not fully awake. These medications can suppress breathing and worsen sleep apnea, particularly when combined with alcohol or other sedating medications, potentially leading to death. Dependence withdrawal symptoms may include tremors, anxiety, hallucinations and seizures.    Stimulants:  Common adverse effects include addiction/dependence, increased blood pressure and heart rate, decreased appetite, nausea, involuntary weight loss, insomnia,  irritability, and headaches. These risks may increase when these medications are combined with other stimulants, such as caffeine pills or energy drinks, certain weight loss supplements and oral decongestants. Dependence withdrawal symptoms may include depressed mood, loss of interest, suicidal thoughts, anxiety, fatigue, appetite changes and agitation. I agree and understand that I and my prescriber have the following rights and responsibilities regarding my treatment plan:   1. MY RIGHTS:  To be informed of my treatment and medication plan. To be an active participant in my health and wellbeing. 2. MY RESPONSIBILITY AND UNDERSTANDING FOR USE OF MEDICATIONS   I will take medications at the dose and frequency as directed. For my safety, I will not increase or change how I take my medications without the recommendation of my healthcare provider.  I will actively participate in any program recommended by my provider which may improve function, including social, physical, psychological programs.  I will not take my medications with alcohol or other drugs not prescribed to me. I understand that drinking alcohol with my medications increases the chances of side effects, including reduced breathing rate and could lead to personal injury when operating machinery.  I understand that if I have a history of substance use disorders, including alcohol or other illicit drugs, that I may be at increased risk of addiction to my medications.  I agree to notify my provider immediately if I should become pregnant so that my treatment plan can be adjusted.    I agree and understand that I shall only receive controlled substance medications from the prescriber that signed this agreement unless there is written agreement among other prescribers of controlled substances outlining the responsibility of the medications being prescribed.  I understand that the if the controlled medication is not helping to achieve goals, the dosage may be tapered and no longer prescribed. 3. MY RESPONSIBILITY FOR COMMUNICATION / PRESCRIPTION RENEWALS   I agree that all controlled substance medications that I take will be prescribed only by my provider. If another healthcare provider prescribes me medication in an emergency, I will notify my provider within seventy-two (72) hours. Heidi Hutchinson (1960)             Page 2 of 4    Initials:_______   I will arrange for refills at the prescribed interval ONLY during regular office hours. I will not ask for refills earlier than agreed, after-hours, on holidays or weekends. Refills may take up to 72 hours for processing and prescriptions to reach the pharmacy.  I will inform my other health care providers that I am taking these medications and of the existence of this [id number]. In the event of an emergency, I will provide the same information to the emergency department prescribers.  I will keep my provider updated on the pharmacy I am using for controlled medication prescription filling. 4. MY RESPONSIBILITY FOR PROTECTING MEDICATIONS   I will protect my prescriptions and medications. I understand that lost or misplaced prescriptions will not be replaced.  I will keep medications only for my own use and will not share them with others. I will keep all medications away from children.  I agree that if my medications are adjusted or discontinued, I will properly dispose of any remaining medications. I understand that I will be required to dispose of any remaining controlled medications as, directed by my prescriber, prior to being provided with any prescriptions for other controlled medications.   Medication drop box locations can be found at: [employer]  5. MY RESPONSIBILITY WITH ILLEGAL DRUGS    I will not use illegal or street drugs or another person's prescription medications not prescribed to me.  If there are identified addiction type symptoms, then referral to a program may be provided by my provider and I agree to follow through with this recommendation. 6. MY RESPONSIBILITY FOR COOPERATION WITH INVESTIGATIONS   I understand that my provider will comply with any applicable law and may discuss my use and/or possible misuse/abuse of controlled substances and alcohol, as appropriate, with any health care provider involved in my care, pharmacist, or legal authority.  I authorize my provider and pharmacy to cooperate fully with law enforcement agencies (as permitted by law) in the investigation of any possible misuse, sale, or other diversion of my controlled substances.  I agree to waive any applicable privilege or right of privacy or confidentiality with respect to these authorizations. 7. PROVIDERS RIGHT TO MONITOR FOR SAFETY: PRESCRIPTION MONITORING / DRUG TESTING   I consent to drug/toxicology screening and will submit to a drug screen upon my providers request to assure I am only taking the prescribed drugs for my safety monitoring. I understand that a drug screen is a laboratory test in which a sample of my urine, blood or saliva is checked to see what drugs I have been taking. This may entail an observed urine specimen, which means that a nurse or other health care provider may watch me provide urine, and I will cooperate if I am asked to provide an observed specimen. Marianna Luz (1960)             Page 3 of 4    Initials:_______  Bhargav Alves I understand that my provider will check a copy of my State Prescription Monitoring Program () Report in order to safely prescribe medications.      Pill Counts: I consent to pill counts when requested. I may be asked to bring all my prescribed controlled substance medications, in their original bottles, to all of my scheduled appointments. In addition, my provider may ask me to come to the practice at any time for a random pill count. 8. TERMINATION OF THIS AGREEMENT   For my safety, my prescriber has the right to stop prescribing controlled substance medications and may end this agreement.  Conditions that may result in termination of this agreement:  a. I do not show any improvement in pain, or my activity has not improved. b. I develop rapid tolerance or loss of improvement, as described in my treatment plan.  c. I develop significant side effects from the medication. d. My behavior is not consistent with the responsibilities outlined above, thereby causing safety concerns to continue prescribing controlled substance medications. e. I fail to follow the terms of this agreement. f. Other:____________________________     UNDERSTANDING THIS MEDICATION AGREEMENT:    I have read the above and have had all my questions answered. For chronic disease management, I know that my symptoms can be managed with many types of treatments. A chronic medication trial may be part of my treatment, but I must be an active participant in my care. Medication therapy is only one part of my symptom management plan. In some cases, there may be limited scientific evidence to support the chronic use of certain medications to improve symptoms and daily function. Furthermore, in certain circumstances, there may be scientific information that suggests that the use of chronic controlled substances may worsen my symptoms and increase my risk of unintentional death directly related to this medication therapy. I know that if my provider feels my risk from controlled medications is greater than my benefit, I will have my controlled substance medication(s) compassionately lowered or removed altogether. I further agree to allow this office to contact my HIPAA contact if there are concerns about my safety and use of the controlled medications. I have agreed to use the prescribed controlled substance medications to me as instructed by my provider and as stated in this Medication Agreement. My initial on each page and my signature below shows that I have read each page and I have had the opportunity to ask questions with answers provided by my provider.       Patient Name (Printed): _____________________________________    Patient Signature:  ______________________   Date: _____________      Prescriber Name (Printed): ___________________________________    Prescriber Signature: _____________________  Date: _____________     Carly Miramontes (1960)             Page 4 of 4

## 2023-02-16 NOTE — PROGRESS NOTES
Isha Mcgee (: 1960) is a 58 y.o. male, patient, here for evaluation of the following chief complaint(s):  Back Pain (Follow Up Bilateral L4/5 ROMEL with Dr Joshua Mendoza @ [facility] on 23)       ASSESSMENT/PLAN:  Below is the assessment and plan developed based on review of pertinent history, physical exam, labs, studies, and medications. Patient presents today for an MRI follow-up. His back and leg symptoms are significantly impacting his ability to perform daily activities, patient is discouraged by the return of back pain. MRI reviewed in detail today, he does have a disc extrusion at L4-5, which is increased compared to prior study. He also has spondylosis at L3-4. The injection did not help his symptoms. We had a lengthy discussion about treatment options. I think he is a candidate for a oblique lateral fusion at L3-4 and L4-5 and posterior percutaneous fusion from L3-S1. Risk benefits were discussed with him. Procedure: OLIF L3-4 L4-5 and revision posterior percutaneous fusion L3-S1      The risks and benefits were discussed at length with the patient and the patient has elected to proceed. Indications for surgery include failed conservative treatment. Alternative treatments, risks and the perioperative course were discussed with the patient. All questions were answered. The risks and benefits of the procedure were explained. Benefits include definitive diagnosis, relief of pain, elimination of deformity and improved function. Risks of surgery including bleeding, infection, weakness, numbness, CSF leak, failure to improve symptoms, exacerbation of medical co-morbidities and even death were discussed with the patient. 1. S/P lumbar fusion  2. Lumbar degenerative disc disease  3. Lumbar stenosis with neurogenic claudication  4. Chronic bilateral low back pain without sciatica      No follow-ups on file. SUBJECTIVE/OBJECTIVE:  Isha Mcgee (: 1960) is a 58 y.o. male.     No flowsheet data found. Patient presents today for an MRI follow-up. He is discouraged by his persistent low back pain, as he states it is significantly impacting his abilities to return to his regular activities. He does report intermittent lower extremity radicular symptoms into the lateral aspect of his right lower extremity. He has only been taking Tylenol. He was prescribed Robaxin at his last visit, but states it has not helped relieve his symptoms. He is currently on a blood thinner, so is unable to tolerate NSAIDs. The rest of his history as noted below-patient was initially progressing well following his lumbar fusion approximately 1 year ago. He states that he was lifting a [employer] tree box in November, 1 and had an onset of worsening pain the following day. He is continues with persistent difficulties with daily activities. He has invested in a  to help with core and back strengthening. No acute changes in bowel or bladder control. No saddle anesthesia. He is here for follow-up of his injections. He did have some mild relief with the numbing portion of the procedure but still has chronic back pain that is worsening. It is affecting his daily activities. He denies any bowel bladder difficulties. Imaging:    XR Results (most recent):  Results from Appointment encounter on 12/08/22    XR SPINE LUMB 2 OR 3 V    Narrative  AP and lateral of the lumbar spine demonstrates a stable L5-S1 fusion. No acute changes noted. MRI Results (most recent):  Results from [place] encounter on 12/19/22    MRI LUMB SPINE WO CONT    Narrative  EXAM: MRI LUMB SPINE WO CONT  Clinical history: Evaluate arthrodesis  INDICATION: . Arthrodesis status / @[facility]    COMPARISON: May 2021    TECHNIQUE: MR imaging of the lumbar spine was performed using the following  sequences: sagittal T1, T2, STIR;  axial T1, T2.    CONTRAST:  None.     FINDINGS:    Status post fusion at L5-S1. Vertebral body heights are maintained. And a  stenosis at L3. The conus medullaris terminates at L1-L2. Signal and caliber of the distal  spinal cord are within normal limits. The paraspinal soft tissues are within normal limits. Lower thoracic spine: No herniation or stenosis. L1-L2: No herniation or stenosis. L2-L3: No herniation or stenosis. L3-L4: Disc desiccation and loss of disc height. Circumferential  bulge/osteophyte. Facet arthropathy. The canal is patent. Foramina are patent    L4-L5: Disc desiccation. Circumferential bulge. Ligamentum flavum hypertrophy. Minimal canal stenosis. Mild caudally oriented disc extrusion in the right  paracentral region. Mild canal stenosis. Mild bilateral foraminal stenosis,  increased. L5-S1: Status post fusion. The canal and foramina are patent. Impression  Status post fusion at L5-S1. Interval minimal caudally oriented disc extrusion at L4-5 with mild canal and  mild bilateral foraminal stenosis at L4-5, increased compared to prior study. Allergies   Allergen Reactions    Morphine Anaphylaxis       Current Outpatient Medications   Medication Sig    methylPREDNISolone (MEDROL DOSEPACK) 4 mg tablet Per dose pack instructions    tiZANidine (ZANAFLEX) 2 mg tablet Take 1-2 Tablets by mouth every eight to twelve (8-12) hours as needed for Muscle Spasm(s). meloxicam (MOBIC) 7.5 mg tablet Take 1 Tablet by mouth two (2) times a day. methocarbamoL (ROBAXIN) 750 mg tablet Take 1 Tablet by mouth three (3) times daily. gabapentin (NEURONTIN) 300 mg capsule Take 1 Capsule by mouth three (3) times daily. Max Daily Amount: 900 mg.    cyclobenzaprine (FLEXERIL) 10 mg tablet Take 1 Tablet by mouth three (3) times daily as needed for Muscle Spasm(s). cyclobenzaprine (FLEXERIL) 10 mg tablet Take 1 Tablet by mouth three (3) times daily as needed for Muscle Spasm(s).  (Patient taking differently: Take 10 mg by mouth three (3) times daily as needed for Muscle Spasm(s). Indications: muscle spasm)    cephalexin (KEFLEX) 750 mg capsule Take 750 mg by mouth four (4) times daily. naloxone (NARCAN) 4 mg/actuation nasal spray Use 1 spray intranasally, then discard. Repeat with new spray every 2 min as needed for opioid overdose symptoms, alternating nostrils. Indications: decrease in rate & depth of breathing due to opioid drug    ondansetron (ZOFRAN ODT) 4 mg disintegrating tablet Take 1 Tablet by mouth every eight (8) hours as needed for Nausea or Vomiting.    warfarin (COUMADIN) 5 mg tablet Take 5 mg by mouth daily. warfarin (COUMADIN) 1 mg tablet Take 1 mg by mouth daily. acetaminophen (TYLENOL) 500 mg tablet Take 2 Tablets by mouth every six (6) hours as needed for Pain. cholecalciferol (VITAMIN D3) (1000 Units /25 mcg) tablet Take 1 Capsule by mouth daily. ascorbic acid, vitamin C, (VITAMIN C) 500 mg tablet Take 1,000 mg by mouth daily. No current facility-administered medications for this visit.        Past Medical History:   Diagnosis Date    Arthritis     Chronic pain     Diverticulitis     Factor 5 Leiden mutation, heterozygous ([facility] [address]     Pulmonary embolism ([facility] [address]         Past Surgical History:   Procedure Laterality Date    HX COLOSTOMY  2006    HX GI      COLONOSCOPY    HX GI  2007    COLOSTOMY REVERSED     HX KNEE ARTHROSCOPY Left 2001    X2    HX ORTHOPAEDIC Left 2019    WRIST    HX ROTATOR CUFF REPAIR Left     MA UNLISTED PROCEDURE ABDOMEN PERITONEUM & OMENTUM  2006    PARTIAL COLECTOMY       Family History   Problem Relation Age of Onset    Cancer Mother         BREAST    No Known Problems Son     No Known Problems Daughter     Anesth Problems Neg Hx         Social History     Tobacco Use    Smoking status: Former     Packs/day: 1.00     Years: 8.00     Pack years: 8.00     Types: Cigarettes     Quit date:      Years since quittin.1    Smokeless tobacco: Never   Vaping Use    Vaping Use: Never used   Substance Use Topics    Alcohol use: Yes     Alcohol/week: 1.0 standard drink     Types: 1 Shots of liquor per week    Drug use: Never        Review of Systems   Constitutional:  Negative for chills and fever. Musculoskeletal:  Positive for arthralgias, back pain and myalgias. Neurological:  Positive for numbness. All other systems reviewed and are negative. Vitals:  Ht 5' 9\" (1.753 m)   Wt 207 lb (93.9 kg)   BMI 30.57 kg/m²    Body mass index is 30.57 kg/m². Physical Exam  Integumentary  Assessment of Surgical Incision - healing and consistent with normal anticipated wound healing. Neurologic  Sensory  Light Touch - Intact - Globally. Overall Assessment of Muscle Strength and Tone reveals  Lower Extremities - Right Iliopsoas - 5/5. Left Iliopsoas - 5/5. Right Tibialis Anterior - 5/5. Left Tibialis Anterior - 5/5. Right Gastroc-Soleus - 5/5. Left Gastroc-Soleus - 5/5. Right EHL - 5/5. Left EHL - 5/5. General Assessment of Reflexes  Right Ankle - Clonus is not present. Left Ankle - Clonus is not present. Reflexes (Dermatomes)  2/2 Normal - Left Achilles (L5-S2), Left Knee (L2-4), Right Achilles (L5-S2) and Right Knee (L2-4). Musculoskeletal  Global Assessment  Examination of related systems reveals - well-developed, well-nourished, in no acute distress, alert and oriented x 3 and normal coordination. Spine/Ribs/Pelvis  Lumbosacral Spine - Examination of the lumbosacral spine reveals - no known fractures or deformities. Inspection and Palpation - Tenderness - moderate. Assessment of pain reveals the following findings - The pain is characterized as - moderate. Location - pain refers to lower back, right greater than left. Positive straight leg raise test on the right. An electronic signature was used to authenticate this note.   -- Giorgi Miles MD

## 2023-02-17 RX ORDER — HYDROCODONE BITARTRATE AND ACETAMINOPHEN 5; 325 MG/1; MG/1
1 TABLET ORAL
Qty: 40 TABLET | Refills: 0 | Status: SHIPPED | OUTPATIENT
Start: 2023-02-17 | End: 2023-03-19

## 2023-04-18 DIAGNOSIS — Z98.1 S/P LUMBAR FUSION: Primary | ICD-10-CM

## 2023-04-18 DIAGNOSIS — M54.50 CHRONIC BILATERAL LOW BACK PAIN WITHOUT SCIATICA: ICD-10-CM

## 2023-04-18 DIAGNOSIS — M48.062 LUMBAR STENOSIS WITH NEUROGENIC CLAUDICATION: ICD-10-CM

## 2023-04-18 DIAGNOSIS — M51.26 HNP (HERNIATED NUCLEUS PULPOSUS), LUMBAR: ICD-10-CM

## 2023-04-18 DIAGNOSIS — M51.36 LUMBAR DEGENERATIVE DISC DISEASE: ICD-10-CM

## 2023-04-18 DIAGNOSIS — G89.29 CHRONIC BILATERAL LOW BACK PAIN WITHOUT SCIATICA: ICD-10-CM

## 2023-04-18 NOTE — TELEPHONE ENCOUNTER
Would like to schedule the surgery for 08/14/2023. He was advised he can have one more Lv Lozada and then it will need to last him until his surgery. His last office visit was 02/16/2023. He reports he would like to keep working his Core with the PT prior to moving forward with surgery.

## 2023-04-21 DIAGNOSIS — G89.29 CHRONIC BILATERAL LOW BACK PAIN WITHOUT SCIATICA: Primary | ICD-10-CM

## 2023-04-21 DIAGNOSIS — M54.50 CHRONIC BILATERAL LOW BACK PAIN WITHOUT SCIATICA: Primary | ICD-10-CM

## 2023-04-21 RX ORDER — HYDROCODONE BITARTRATE AND ACETAMINOPHEN 5; 325 MG/1; MG/1
1 TABLET ORAL
Qty: 20 TABLET | Refills: 0 | Status: SHIPPED | OUTPATIENT
Start: 2023-04-21 | End: 2023-04-24

## 2024-10-14 ENCOUNTER — HOSPITAL ENCOUNTER (OUTPATIENT)
Facility: HOSPITAL | Age: 64
Discharge: HOME | End: 2024-10-17
Attending: INTERNAL MEDICINE
Payer: COMMERCIAL

## 2024-10-14 DIAGNOSIS — K43.9 VENTRAL HERNIA WITHOUT OBSTRUCTION OR GANGRENE: ICD-10-CM

## 2024-10-14 LAB — CREAT BLD-MCNC: 1.4 MG/DL (ref 0.6–1.3)

## 2024-10-14 PROCEDURE — 74177 CT ABD & PELVIS W/CONTRAST: CPT

## 2024-10-14 PROCEDURE — 82565 ASSAY OF CREATININE: CPT

## 2024-10-14 PROCEDURE — 6360000004 HC RX CONTRAST MEDICATION: Performed by: INTERNAL MEDICINE

## 2024-10-14 RX ORDER — IOPAMIDOL 755 MG/ML
100 INJECTION, SOLUTION INTRAVASCULAR
Status: COMPLETED | OUTPATIENT
Start: 2024-10-14 | End: 2024-10-14

## 2024-10-14 RX ADMIN — IOPAMIDOL 100 ML: 755 INJECTION, SOLUTION INTRAVENOUS at 09:38

## (undated) DEVICE — STERILITY SLEEVE: Brand: COOK

## (undated) DEVICE — SUTURE ABSRB BRAID COAT UD OS-6 NO 1 27IN VCRL J535H

## (undated) DEVICE — SOLUTION IRRIG 3000ML 0.9% SOD CHL USP UROMATIC PLAS CONT

## (undated) DEVICE — C-ARM: Brand: UNBRANDED

## (undated) DEVICE — HANDPIECE SET WITH BONE CLEANING TIP AND SUCTION TUBE: Brand: INTERPULSE

## (undated) DEVICE — SPONGE GZ W4XL4IN COT 12 PLY TYP VII WVN C FLD DSGN

## (undated) DEVICE — GLOVE ORANGE PI 7 1/2   MSG9075

## (undated) DEVICE — ZIP 8I SURGICAL SKIN CLOSURE DEVICE: Brand: ZIP 8I SURGICAL SKIN CLOSURE DEVICE

## (undated) DEVICE — TOOL 14MH30 LEGEND 14CM 3MM: Brand: MIDAS REX ™

## (undated) DEVICE — POSITIONER HD REST FOAM CMFRT TCH

## (undated) DEVICE — 4-PORT MANIFOLD: Brand: NEPTUNE 2

## (undated) DEVICE — SUTURE VCRL 2-0 L27IN ABSRB UD CP-2 L26MM 1/2 CIR REV CUT J869H

## (undated) DEVICE — Device

## (undated) DEVICE — ELECTRODE BLDE L4IN NONINSULATED EDGE

## (undated) DEVICE — GARMENT,MEDLINE,DVT,INT,CALF,MED, GEN2: Brand: MEDLINE

## (undated) DEVICE — SOLUTION IV 250ML 0.9% SOD CHL CLR INJ FLX BG CONT PRT CLSR

## (undated) DEVICE — KIT EVAC 0.13IN RECT TB DIA10FR 400CC PVC 3 SPR Y CONN DRN

## (undated) DEVICE — KIT ROBOTIC SPINE DISP MAZORX

## (undated) DEVICE — GLOVE SURG SZ 8 L12IN FNGR THK94MIL STD WHT LTX FREE

## (undated) DEVICE — PAD,ABDOMINAL,5"X9",ST,LF,25/BX: Brand: MEDLINE INDUSTRIES, INC.

## (undated) DEVICE — DEVICE TRNSF SPIK STL 2008S] MICROTEK MEDICAL INC]

## (undated) DEVICE — STERILE-Z SURGICAL PATIENT COVERS CLEAR POLYETHYLENE STERILE UNIVERSAL FIT 10 PER CASE: Brand: STERILE-Z

## (undated) DEVICE — NEEDLE HYPO 22GA L1.5IN BLK S STL HUB POLYPR SHLD REG BVL

## (undated) DEVICE — PEDICLE SCREW PROBE

## (undated) DEVICE — BIPOLAR IRRIGATOR INTEGRATED TUBING AND BIPOLAR CORD SET, DISPOSABLE

## (undated) DEVICE — LAMINECTOMY-SMH: Brand: MEDLINE INDUSTRIES, INC.

## (undated) DEVICE — BONE WAX WHITE: Brand: BONE WAX WHITE

## (undated) DEVICE — COVER,TABLE,HEAVY DUTY,60"X90",STRL: Brand: MEDLINE

## (undated) DEVICE — SOLUTION SURG PREP 26 CC PURPREP

## (undated) DEVICE — COVER,MAYO STAND,STERILE: Brand: MEDLINE